# Patient Record
Sex: FEMALE | Race: WHITE | NOT HISPANIC OR LATINO | Employment: OTHER | URBAN - METROPOLITAN AREA
[De-identification: names, ages, dates, MRNs, and addresses within clinical notes are randomized per-mention and may not be internally consistent; named-entity substitution may affect disease eponyms.]

---

## 2022-03-25 DIAGNOSIS — R09.02 HYPOXIA: Primary | ICD-10-CM

## 2022-03-25 DIAGNOSIS — R51.9 NONINTRACTABLE HEADACHE, UNSPECIFIED CHRONICITY PATTERN, UNSPECIFIED HEADACHE TYPE: Primary | ICD-10-CM

## 2022-03-25 LAB
EKG IMPRESSION: NORMAL
EKG IMPRESSION: NORMAL

## 2022-12-19 DIAGNOSIS — Z01.810 PRE-OPERATIVE CARDIOVASCULAR EXAMINATION: Primary | ICD-10-CM

## 2022-12-21 LAB — EKG IMPRESSION: NORMAL

## 2023-05-08 ENCOUNTER — APPOINTMENT (OUTPATIENT)
Dept: RADIOLOGY | Facility: MEDICAL CENTER | Age: 63
End: 2023-05-08
Attending: EMERGENCY MEDICINE
Payer: MEDICARE

## 2023-05-08 ENCOUNTER — HOSPITAL ENCOUNTER (OUTPATIENT)
Facility: MEDICAL CENTER | Age: 63
End: 2023-05-11
Attending: EMERGENCY MEDICINE | Admitting: STUDENT IN AN ORGANIZED HEALTH CARE EDUCATION/TRAINING PROGRAM
Payer: MEDICARE

## 2023-05-08 DIAGNOSIS — I48.91 NEW ONSET A-FIB (HCC): ICD-10-CM

## 2023-05-08 DIAGNOSIS — R06.09 EXERTIONAL DYSPNEA: ICD-10-CM

## 2023-05-08 DIAGNOSIS — G47.34 NOCTURNAL HYPOXIA: ICD-10-CM

## 2023-05-08 DIAGNOSIS — R07.2 PRECORDIAL CHEST PAIN: ICD-10-CM

## 2023-05-08 DIAGNOSIS — I48.91 ATRIAL FIBRILLATION WITH RVR (HCC): ICD-10-CM

## 2023-05-08 PROBLEM — R07.9 CHEST PAIN: Status: ACTIVE | Noted: 2023-05-08

## 2023-05-08 PROBLEM — I10 HYPERTENSION: Status: ACTIVE | Noted: 2023-05-08

## 2023-05-08 PROBLEM — E66.01 CLASS 3 SEVERE OBESITY IN ADULT (HCC): Status: ACTIVE | Noted: 2023-05-08

## 2023-05-08 PROBLEM — E11.9 DIABETES (HCC): Status: ACTIVE | Noted: 2023-05-08

## 2023-05-08 PROBLEM — I48.0 PAROXYSMAL ATRIAL FIBRILLATION (HCC): Status: ACTIVE | Noted: 2023-05-08

## 2023-05-08 PROBLEM — E78.5 HYPERLIPIDEMIA: Status: ACTIVE | Noted: 2023-05-08

## 2023-05-08 LAB
ALBUMIN SERPL BCP-MCNC: 4.5 G/DL (ref 3.2–4.9)
ALBUMIN/GLOB SERPL: 1.7 G/DL
ALP SERPL-CCNC: 77 U/L (ref 30–99)
ALT SERPL-CCNC: 14 U/L (ref 2–50)
ANION GAP SERPL CALC-SCNC: 12 MMOL/L (ref 7–16)
APTT PPP: 30.5 SEC (ref 24.7–36)
AST SERPL-CCNC: 18 U/L (ref 12–45)
BASOPHILS # BLD AUTO: 0.8 % (ref 0–1.8)
BASOPHILS # BLD: 0.07 K/UL (ref 0–0.12)
BILIRUB SERPL-MCNC: 0.4 MG/DL (ref 0.1–1.5)
BUN SERPL-MCNC: 13 MG/DL (ref 8–22)
CALCIUM ALBUM COR SERPL-MCNC: 9.3 MG/DL (ref 8.5–10.5)
CALCIUM SERPL-MCNC: 9.7 MG/DL (ref 8.5–10.5)
CHLORIDE SERPL-SCNC: 98 MMOL/L (ref 96–112)
CO2 SERPL-SCNC: 25 MMOL/L (ref 20–33)
CREAT SERPL-MCNC: 0.71 MG/DL (ref 0.5–1.4)
EKG IMPRESSION: NORMAL
EOSINOPHIL # BLD AUTO: 0.07 K/UL (ref 0–0.51)
EOSINOPHIL NFR BLD: 0.8 % (ref 0–6.9)
ERYTHROCYTE [DISTWIDTH] IN BLOOD BY AUTOMATED COUNT: 48.5 FL (ref 35.9–50)
EST. AVERAGE GLUCOSE BLD GHB EST-MCNC: 126 MG/DL
GFR SERPLBLD CREATININE-BSD FMLA CKD-EPI: 94 ML/MIN/1.73 M 2
GLOBULIN SER CALC-MCNC: 2.7 G/DL (ref 1.9–3.5)
GLUCOSE SERPL-MCNC: 113 MG/DL (ref 65–99)
HBA1C MFR BLD: 6 % (ref 4–5.6)
HCT VFR BLD AUTO: 44.9 % (ref 37–47)
HGB BLD-MCNC: 14.9 G/DL (ref 12–16)
IMM GRANULOCYTES # BLD AUTO: 0.03 K/UL (ref 0–0.11)
IMM GRANULOCYTES NFR BLD AUTO: 0.3 % (ref 0–0.9)
INR PPP: 1.06 (ref 0.87–1.13)
LYMPHOCYTES # BLD AUTO: 2.26 K/UL (ref 1–4.8)
LYMPHOCYTES NFR BLD: 24.8 % (ref 22–41)
MAGNESIUM SERPL-MCNC: 1.7 MG/DL (ref 1.5–2.5)
MCH RBC QN AUTO: 31.7 PG (ref 27–33)
MCHC RBC AUTO-ENTMCNC: 33.2 G/DL (ref 33.6–35)
MCV RBC AUTO: 95.5 FL (ref 81.4–97.8)
MONOCYTES # BLD AUTO: 0.64 K/UL (ref 0–0.85)
MONOCYTES NFR BLD AUTO: 7 % (ref 0–13.4)
NEUTROPHILS # BLD AUTO: 6.05 K/UL (ref 2–7.15)
NEUTROPHILS NFR BLD: 66.3 % (ref 44–72)
NRBC # BLD AUTO: 0 K/UL
NRBC BLD-RTO: 0 /100 WBC
NT-PROBNP SERPL IA-MCNC: 1284 PG/ML (ref 0–125)
PLATELET # BLD AUTO: 352 K/UL (ref 164–446)
PMV BLD AUTO: 10.3 FL (ref 9–12.9)
POTASSIUM SERPL-SCNC: 4.1 MMOL/L (ref 3.6–5.5)
PROT SERPL-MCNC: 7.2 G/DL (ref 6–8.2)
PROTHROMBIN TIME: 13.7 SEC (ref 12–14.6)
RBC # BLD AUTO: 4.7 M/UL (ref 4.2–5.4)
SODIUM SERPL-SCNC: 135 MMOL/L (ref 135–145)
TROPONIN T SERPL-MCNC: 8 NG/L (ref 6–19)
WBC # BLD AUTO: 9.1 K/UL (ref 4.8–10.8)

## 2023-05-08 PROCEDURE — 71045 X-RAY EXAM CHEST 1 VIEW: CPT

## 2023-05-08 PROCEDURE — 36415 COLL VENOUS BLD VENIPUNCTURE: CPT

## 2023-05-08 PROCEDURE — 83036 HEMOGLOBIN GLYCOSYLATED A1C: CPT

## 2023-05-08 PROCEDURE — 94760 N-INVAS EAR/PLS OXIMETRY 1: CPT

## 2023-05-08 PROCEDURE — 83880 ASSAY OF NATRIURETIC PEPTIDE: CPT

## 2023-05-08 PROCEDURE — A9270 NON-COVERED ITEM OR SERVICE: HCPCS | Mod: UD | Performed by: EMERGENCY MEDICINE

## 2023-05-08 PROCEDURE — 93005 ELECTROCARDIOGRAM TRACING: CPT

## 2023-05-08 PROCEDURE — G0378 HOSPITAL OBSERVATION PER HR: HCPCS

## 2023-05-08 PROCEDURE — A9270 NON-COVERED ITEM OR SERVICE: HCPCS | Mod: UD | Performed by: STUDENT IN AN ORGANIZED HEALTH CARE EDUCATION/TRAINING PROGRAM

## 2023-05-08 PROCEDURE — 700102 HCHG RX REV CODE 250 W/ 637 OVERRIDE(OP): Mod: UD | Performed by: STUDENT IN AN ORGANIZED HEALTH CARE EDUCATION/TRAINING PROGRAM

## 2023-05-08 PROCEDURE — 85730 THROMBOPLASTIN TIME PARTIAL: CPT

## 2023-05-08 PROCEDURE — 85025 COMPLETE CBC W/AUTO DIFF WBC: CPT

## 2023-05-08 PROCEDURE — 99223 1ST HOSP IP/OBS HIGH 75: CPT | Mod: AI | Performed by: STUDENT IN AN ORGANIZED HEALTH CARE EDUCATION/TRAINING PROGRAM

## 2023-05-08 PROCEDURE — 83735 ASSAY OF MAGNESIUM: CPT

## 2023-05-08 PROCEDURE — 93005 ELECTROCARDIOGRAM TRACING: CPT | Performed by: EMERGENCY MEDICINE

## 2023-05-08 PROCEDURE — 84443 ASSAY THYROID STIM HORMONE: CPT

## 2023-05-08 PROCEDURE — 700102 HCHG RX REV CODE 250 W/ 637 OVERRIDE(OP): Mod: UD | Performed by: EMERGENCY MEDICINE

## 2023-05-08 PROCEDURE — 85610 PROTHROMBIN TIME: CPT

## 2023-05-08 PROCEDURE — 84484 ASSAY OF TROPONIN QUANT: CPT

## 2023-05-08 PROCEDURE — 96375 TX/PRO/DX INJ NEW DRUG ADDON: CPT

## 2023-05-08 PROCEDURE — 96374 THER/PROPH/DIAG INJ IV PUSH: CPT

## 2023-05-08 PROCEDURE — 99285 EMERGENCY DEPT VISIT HI MDM: CPT

## 2023-05-08 PROCEDURE — 80053 COMPREHEN METABOLIC PANEL: CPT

## 2023-05-08 RX ORDER — DILTIAZEM HYDROCHLORIDE 5 MG/ML
10 INJECTION INTRAVENOUS EVERY 4 HOURS PRN
Status: DISCONTINUED | OUTPATIENT
Start: 2023-05-08 | End: 2023-05-11 | Stop reason: HOSPADM

## 2023-05-08 RX ORDER — ASPIRIN 81 MG/1
324 TABLET, CHEWABLE ORAL DAILY
Status: DISCONTINUED | OUTPATIENT
Start: 2023-05-09 | End: 2023-05-08

## 2023-05-08 RX ORDER — MONTELUKAST SODIUM 10 MG/1
10 TABLET ORAL
COMMUNITY

## 2023-05-08 RX ORDER — PROCHLORPERAZINE EDISYLATE 5 MG/ML
5-10 INJECTION INTRAMUSCULAR; INTRAVENOUS EVERY 4 HOURS PRN
Status: DISCONTINUED | OUTPATIENT
Start: 2023-05-08 | End: 2023-05-11 | Stop reason: HOSPADM

## 2023-05-08 RX ORDER — ACETAMINOPHEN 325 MG/1
650 TABLET ORAL EVERY 6 HOURS PRN
Status: DISCONTINUED | OUTPATIENT
Start: 2023-05-08 | End: 2023-05-11 | Stop reason: HOSPADM

## 2023-05-08 RX ORDER — ONDANSETRON 4 MG/1
4 TABLET, ORALLY DISINTEGRATING ORAL EVERY 4 HOURS PRN
Status: DISCONTINUED | OUTPATIENT
Start: 2023-05-08 | End: 2023-05-11 | Stop reason: HOSPADM

## 2023-05-08 RX ORDER — ONDANSETRON 2 MG/ML
4 INJECTION INTRAMUSCULAR; INTRAVENOUS EVERY 4 HOURS PRN
Status: DISCONTINUED | OUTPATIENT
Start: 2023-05-08 | End: 2023-05-11 | Stop reason: HOSPADM

## 2023-05-08 RX ORDER — DOCUSATE SODIUM 100 MG/1
100 CAPSULE, LIQUID FILLED ORAL 2 TIMES DAILY
COMMUNITY

## 2023-05-08 RX ORDER — AMOXICILLIN 250 MG
2 CAPSULE ORAL 2 TIMES DAILY
Status: DISCONTINUED | OUTPATIENT
Start: 2023-05-08 | End: 2023-05-11 | Stop reason: HOSPADM

## 2023-05-08 RX ORDER — BISACODYL 10 MG
10 SUPPOSITORY, RECTAL RECTAL
Status: DISCONTINUED | OUTPATIENT
Start: 2023-05-08 | End: 2023-05-11 | Stop reason: HOSPADM

## 2023-05-08 RX ORDER — OMEPRAZOLE 20 MG/1
20 CAPSULE, DELAYED RELEASE ORAL DAILY
COMMUNITY

## 2023-05-08 RX ORDER — LISINOPRIL AND HYDROCHLOROTHIAZIDE 25; 20 MG/1; MG/1
2 TABLET ORAL EVERY MORNING
COMMUNITY

## 2023-05-08 RX ORDER — ATORVASTATIN CALCIUM 20 MG/1
20 TABLET, FILM COATED ORAL
Status: DISCONTINUED | OUTPATIENT
Start: 2023-05-09 | End: 2023-05-11 | Stop reason: HOSPADM

## 2023-05-08 RX ORDER — TRAZODONE HYDROCHLORIDE 50 MG/1
50 TABLET ORAL
COMMUNITY

## 2023-05-08 RX ORDER — ATORVASTATIN CALCIUM 20 MG/1
20 TABLET, FILM COATED ORAL
COMMUNITY

## 2023-05-08 RX ORDER — ASPIRIN 325 MG
325 TABLET ORAL DAILY
Status: DISCONTINUED | OUTPATIENT
Start: 2023-05-09 | End: 2023-05-08

## 2023-05-08 RX ORDER — LABETALOL HYDROCHLORIDE 5 MG/ML
10 INJECTION, SOLUTION INTRAVENOUS EVERY 4 HOURS PRN
Status: DISCONTINUED | OUTPATIENT
Start: 2023-05-08 | End: 2023-05-11 | Stop reason: HOSPADM

## 2023-05-08 RX ORDER — POLYETHYLENE GLYCOL 3350 17 G/17G
1 POWDER, FOR SOLUTION ORAL
Status: DISCONTINUED | OUTPATIENT
Start: 2023-05-08 | End: 2023-05-11 | Stop reason: HOSPADM

## 2023-05-08 RX ORDER — OMEPRAZOLE 20 MG/1
20 CAPSULE, DELAYED RELEASE ORAL DAILY
Status: DISCONTINUED | OUTPATIENT
Start: 2023-05-09 | End: 2023-05-11 | Stop reason: HOSPADM

## 2023-05-08 RX ORDER — FUROSEMIDE 40 MG/1
40 TABLET ORAL DAILY
Status: DISCONTINUED | OUTPATIENT
Start: 2023-05-09 | End: 2023-05-11 | Stop reason: HOSPADM

## 2023-05-08 RX ORDER — PROMETHAZINE HYDROCHLORIDE 25 MG/1
12.5-25 TABLET ORAL EVERY 4 HOURS PRN
Status: DISCONTINUED | OUTPATIENT
Start: 2023-05-08 | End: 2023-05-11 | Stop reason: HOSPADM

## 2023-05-08 RX ORDER — MONTELUKAST SODIUM 10 MG/1
10 TABLET ORAL
Status: DISCONTINUED | OUTPATIENT
Start: 2023-05-09 | End: 2023-05-11 | Stop reason: HOSPADM

## 2023-05-08 RX ORDER — LISINOPRIL AND HYDROCHLOROTHIAZIDE 25; 20 MG/1; MG/1
2 TABLET ORAL EVERY MORNING
Status: DISCONTINUED | OUTPATIENT
Start: 2023-05-09 | End: 2023-05-09

## 2023-05-08 RX ORDER — FUROSEMIDE 40 MG/1
40 TABLET ORAL DAILY
COMMUNITY

## 2023-05-08 RX ORDER — ASPIRIN 300 MG/1
300 SUPPOSITORY RECTAL DAILY
Status: DISCONTINUED | OUTPATIENT
Start: 2023-05-09 | End: 2023-05-08

## 2023-05-08 RX ORDER — DILTIAZEM HYDROCHLORIDE 60 MG/1
60 TABLET, FILM COATED ORAL ONCE
Status: COMPLETED | OUTPATIENT
Start: 2023-05-08 | End: 2023-05-08

## 2023-05-08 RX ORDER — TRAZODONE HYDROCHLORIDE 50 MG/1
50 TABLET ORAL
Status: DISCONTINUED | OUTPATIENT
Start: 2023-05-09 | End: 2023-05-11 | Stop reason: HOSPADM

## 2023-05-08 RX ORDER — PROMETHAZINE HYDROCHLORIDE 25 MG/1
12.5-25 SUPPOSITORY RECTAL EVERY 4 HOURS PRN
Status: DISCONTINUED | OUTPATIENT
Start: 2023-05-08 | End: 2023-05-11 | Stop reason: HOSPADM

## 2023-05-08 RX ORDER — DILTIAZEM HYDROCHLORIDE 5 MG/ML
25 INJECTION INTRAVENOUS
Status: DISCONTINUED | OUTPATIENT
Start: 2023-05-08 | End: 2023-05-08

## 2023-05-08 RX ADMIN — RIVAROXABAN 20 MG: 20 TABLET, FILM COATED ORAL at 18:18

## 2023-05-08 RX ADMIN — DILTIAZEM HYDROCHLORIDE 60 MG: 60 TABLET, FILM COATED ORAL at 18:18

## 2023-05-08 RX ADMIN — DILTIAZEM HYDROCHLORIDE 30 MG: 30 TABLET, FILM COATED ORAL at 23:17

## 2023-05-08 ASSESSMENT — PATIENT HEALTH QUESTIONNAIRE - PHQ9
2. FEELING DOWN, DEPRESSED, IRRITABLE, OR HOPELESS: NOT AT ALL
1. LITTLE INTEREST OR PLEASURE IN DOING THINGS: NOT AT ALL
SUM OF ALL RESPONSES TO PHQ9 QUESTIONS 1 AND 2: 0

## 2023-05-08 ASSESSMENT — CHA2DS2 SCORE
PRIOR STROKE OR TIA OR THROMBOEMBOLISM: NO
DIABETES: NO
CHF OR LEFT VENTRICULAR DYSFUNCTION: NO
VASCULAR DISEASE: NO
AGE 65 TO 74: NO
CHA2DS2 VASC SCORE: 2
AGE 75 OR GREATER: NO
SEX: FEMALE
HYPERTENSION: YES

## 2023-05-08 ASSESSMENT — PAIN DESCRIPTION - PAIN TYPE: TYPE: ACUTE PAIN

## 2023-05-08 ASSESSMENT — ENCOUNTER SYMPTOMS
ABDOMINAL PAIN: 0
BRUISES/BLEEDS EASILY: 0
CHILLS: 0
FEVER: 0
HEADACHES: 0
MYALGIAS: 0
NAUSEA: 0
DIZZINESS: 0
HEARTBURN: 0
SHORTNESS OF BREATH: 1
VOMITING: 0
PALPITATIONS: 1
BLURRED VISION: 0
DOUBLE VISION: 0
DEPRESSION: 0

## 2023-05-08 ASSESSMENT — LIFESTYLE VARIABLES
EVER HAD A DRINK FIRST THING IN THE MORNING TO STEADY YOUR NERVES TO GET RID OF A HANGOVER: NO
HAVE PEOPLE ANNOYED YOU BY CRITICIZING YOUR DRINKING: NO
HAVE YOU EVER FELT YOU SHOULD CUT DOWN ON YOUR DRINKING: NO
TOTAL SCORE: 0
HOW MANY TIMES IN THE PAST YEAR HAVE YOU HAD 5 OR MORE DRINKS IN A DAY: 0
AVERAGE NUMBER OF DAYS PER WEEK YOU HAVE A DRINK CONTAINING ALCOHOL: 0
EVER FELT BAD OR GUILTY ABOUT YOUR DRINKING: NO
ALCOHOL_USE: NO
ON A TYPICAL DAY WHEN YOU DRINK ALCOHOL HOW MANY DRINKS DO YOU HAVE: 0
SUBSTANCE_ABUSE: 0
TOTAL SCORE: 0
CONSUMPTION TOTAL: NEGATIVE
TOTAL SCORE: 0

## 2023-05-08 ASSESSMENT — FIBROSIS 4 INDEX: FIB4 SCORE: 0.87

## 2023-05-08 NOTE — ED TRIAGE NOTES
Chief Complaint   Patient presents with    Hypertension    Sent by MD Urrutia at [facility] appointment and found to have hypertension/abnormal ekg and sent to [facility] for further eval. Pt reports taking high blood pressure medication.    Shortness of Breath     EKG shows a-fib.     Explained to pt triage process, made pt aware to tell this RN/staff of any changes/concerns, pt verbalized understanding of process and instructions given. Pt to FRANCISCO ghosh.

## 2023-05-09 ENCOUNTER — APPOINTMENT (OUTPATIENT)
Dept: RADIOLOGY | Facility: MEDICAL CENTER | Age: 63
End: 2023-05-09
Attending: STUDENT IN AN ORGANIZED HEALTH CARE EDUCATION/TRAINING PROGRAM
Payer: MEDICARE

## 2023-05-09 ENCOUNTER — APPOINTMENT (OUTPATIENT)
Dept: CARDIOLOGY | Facility: MEDICAL CENTER | Age: 63
End: 2023-05-09
Attending: STUDENT IN AN ORGANIZED HEALTH CARE EDUCATION/TRAINING PROGRAM
Payer: MEDICARE

## 2023-05-09 PROBLEM — J45.909 ASTHMA: Status: ACTIVE | Noted: 2023-05-09

## 2023-05-09 LAB
ALBUMIN SERPL BCP-MCNC: 3.8 G/DL (ref 3.2–4.9)
AMPHET UR QL SCN: NEGATIVE
BARBITURATES UR QL SCN: NEGATIVE
BASOPHILS # BLD AUTO: 0.4 % (ref 0–1.8)
BASOPHILS # BLD: 0.03 K/UL (ref 0–0.12)
BENZODIAZ UR QL SCN: NEGATIVE
BUN SERPL-MCNC: 13 MG/DL (ref 8–22)
BZE UR QL SCN: NEGATIVE
CALCIUM ALBUM COR SERPL-MCNC: 9.4 MG/DL (ref 8.5–10.5)
CALCIUM SERPL-MCNC: 9.2 MG/DL (ref 8.5–10.5)
CANNABINOIDS UR QL SCN: NEGATIVE
CHLORIDE SERPL-SCNC: 99 MMOL/L (ref 96–112)
CHOLEST SERPL-MCNC: 139 MG/DL (ref 100–199)
CO2 SERPL-SCNC: 27 MMOL/L (ref 20–33)
CREAT SERPL-MCNC: 0.57 MG/DL (ref 0.5–1.4)
EKG IMPRESSION: NORMAL
EOSINOPHIL # BLD AUTO: 0.09 K/UL (ref 0–0.51)
EOSINOPHIL NFR BLD: 1.1 % (ref 0–6.9)
ERYTHROCYTE [DISTWIDTH] IN BLOOD BY AUTOMATED COUNT: 47 FL (ref 35.9–50)
FENTANYL UR QL: NEGATIVE
GFR SERPLBLD CREATININE-BSD FMLA CKD-EPI: 101 ML/MIN/1.73 M 2
GLUCOSE BLD STRIP.AUTO-MCNC: 117 MG/DL (ref 65–99)
GLUCOSE BLD STRIP.AUTO-MCNC: 120 MG/DL (ref 65–99)
GLUCOSE BLD STRIP.AUTO-MCNC: 129 MG/DL (ref 65–99)
GLUCOSE BLD STRIP.AUTO-MCNC: 177 MG/DL (ref 65–99)
GLUCOSE SERPL-MCNC: 124 MG/DL (ref 65–99)
HCT VFR BLD AUTO: 38.9 % (ref 37–47)
HDLC SERPL-MCNC: 51 MG/DL
HGB BLD-MCNC: 13.1 G/DL (ref 12–16)
IMM GRANULOCYTES # BLD AUTO: 0.03 K/UL (ref 0–0.11)
IMM GRANULOCYTES NFR BLD AUTO: 0.4 % (ref 0–0.9)
LDLC SERPL CALC-MCNC: 76 MG/DL
LV EJECT FRACT  99904: 65
LV EJECT FRACT MOD 2C 99903: 66.63
LV EJECT FRACT MOD 4C 99902: 64.94
LV EJECT FRACT MOD BP 99901: 65.9
LYMPHOCYTES # BLD AUTO: 2.51 K/UL (ref 1–4.8)
LYMPHOCYTES NFR BLD: 29.7 % (ref 22–41)
MAGNESIUM SERPL-MCNC: 1.8 MG/DL (ref 1.5–2.5)
MCH RBC QN AUTO: 31.4 PG (ref 27–33)
MCHC RBC AUTO-ENTMCNC: 33.7 G/DL (ref 33.6–35)
MCV RBC AUTO: 93.3 FL (ref 81.4–97.8)
METHADONE UR QL SCN: NEGATIVE
MONOCYTES # BLD AUTO: 0.68 K/UL (ref 0–0.85)
MONOCYTES NFR BLD AUTO: 8 % (ref 0–13.4)
NEUTROPHILS # BLD AUTO: 5.12 K/UL (ref 2–7.15)
NEUTROPHILS NFR BLD: 60.4 % (ref 44–72)
NRBC # BLD AUTO: 0 K/UL
NRBC BLD-RTO: 0 /100 WBC
OPIATES UR QL SCN: NEGATIVE
OXYCODONE UR QL SCN: NEGATIVE
PCP UR QL SCN: NEGATIVE
PHOSPHATE SERPL-MCNC: 3.3 MG/DL (ref 2.5–4.5)
PLATELET # BLD AUTO: 300 K/UL (ref 164–446)
PMV BLD AUTO: 10 FL (ref 9–12.9)
POTASSIUM SERPL-SCNC: 3.8 MMOL/L (ref 3.6–5.5)
PROPOXYPH UR QL SCN: NEGATIVE
RBC # BLD AUTO: 4.17 M/UL (ref 4.2–5.4)
SODIUM SERPL-SCNC: 137 MMOL/L (ref 135–145)
TRIGL SERPL-MCNC: 59 MG/DL (ref 0–149)
TROPONIN T SERPL-MCNC: 11 NG/L (ref 6–19)
TSH SERPL DL<=0.005 MIU/L-ACNC: 1.32 UIU/ML (ref 0.38–5.33)
WBC # BLD AUTO: 8.5 K/UL (ref 4.8–10.8)

## 2023-05-09 PROCEDURE — 85025 COMPLETE CBC W/AUTO DIFF WBC: CPT

## 2023-05-09 PROCEDURE — 93306 TTE W/DOPPLER COMPLETE: CPT

## 2023-05-09 PROCEDURE — 78452 HT MUSCLE IMAGE SPECT MULT: CPT

## 2023-05-09 PROCEDURE — 700111 HCHG RX REV CODE 636 W/ 250 OVERRIDE (IP): Mod: UD

## 2023-05-09 PROCEDURE — 93005 ELECTROCARDIOGRAM TRACING: CPT | Performed by: STUDENT IN AN ORGANIZED HEALTH CARE EDUCATION/TRAINING PROGRAM

## 2023-05-09 PROCEDURE — 99204 OFFICE O/P NEW MOD 45 MIN: CPT | Mod: GC | Performed by: INTERNAL MEDICINE

## 2023-05-09 PROCEDURE — 84484 ASSAY OF TROPONIN QUANT: CPT

## 2023-05-09 PROCEDURE — 36415 COLL VENOUS BLD VENIPUNCTURE: CPT

## 2023-05-09 PROCEDURE — 700102 HCHG RX REV CODE 250 W/ 637 OVERRIDE(OP): Mod: UD | Performed by: STUDENT IN AN ORGANIZED HEALTH CARE EDUCATION/TRAINING PROGRAM

## 2023-05-09 PROCEDURE — 82962 GLUCOSE BLOOD TEST: CPT

## 2023-05-09 PROCEDURE — 93306 TTE W/DOPPLER COMPLETE: CPT | Mod: 26 | Performed by: INTERNAL MEDICINE

## 2023-05-09 PROCEDURE — 83735 ASSAY OF MAGNESIUM: CPT

## 2023-05-09 PROCEDURE — 99233 SBSQ HOSP IP/OBS HIGH 50: CPT | Performed by: INTERNAL MEDICINE

## 2023-05-09 PROCEDURE — 80061 LIPID PANEL: CPT

## 2023-05-09 PROCEDURE — G0378 HOSPITAL OBSERVATION PER HR: HCPCS

## 2023-05-09 PROCEDURE — A9270 NON-COVERED ITEM OR SERVICE: HCPCS | Mod: UD | Performed by: STUDENT IN AN ORGANIZED HEALTH CARE EDUCATION/TRAINING PROGRAM

## 2023-05-09 PROCEDURE — 80307 DRUG TEST PRSMV CHEM ANLYZR: CPT

## 2023-05-09 PROCEDURE — 80069 RENAL FUNCTION PANEL: CPT

## 2023-05-09 PROCEDURE — 93010 ELECTROCARDIOGRAM REPORT: CPT | Performed by: INTERNAL MEDICINE

## 2023-05-09 RX ORDER — LISINOPRIL 20 MG/1
20 TABLET ORAL
Status: DISCONTINUED | OUTPATIENT
Start: 2023-05-09 | End: 2023-05-11 | Stop reason: HOSPADM

## 2023-05-09 RX ORDER — REGADENOSON 0.08 MG/ML
INJECTION, SOLUTION INTRAVENOUS
Status: COMPLETED
Start: 2023-05-09 | End: 2023-05-09

## 2023-05-09 RX ORDER — HYDROCHLOROTHIAZIDE 25 MG/1
25 TABLET ORAL
Status: DISCONTINUED | OUTPATIENT
Start: 2023-05-09 | End: 2023-05-11 | Stop reason: HOSPADM

## 2023-05-09 RX ORDER — AMINOPHYLLINE 25 MG/ML
100 INJECTION, SOLUTION INTRAVENOUS
Status: DISCONTINUED | OUTPATIENT
Start: 2023-05-09 | End: 2023-05-11 | Stop reason: HOSPADM

## 2023-05-09 RX ORDER — REGADENOSON 0.08 MG/ML
0.4 INJECTION, SOLUTION INTRAVENOUS ONCE
Status: ACTIVE | OUTPATIENT
Start: 2023-05-09 | End: 2023-05-10

## 2023-05-09 RX ORDER — DILTIAZEM HCL 90 MG
90 TABLET ORAL EVERY 6 HOURS
Status: DISCONTINUED | OUTPATIENT
Start: 2023-05-09 | End: 2023-05-10

## 2023-05-09 RX ADMIN — DILTIAZEM HYDROCHLORIDE 90 MG: 90 TABLET, FILM COATED ORAL at 11:24

## 2023-05-09 RX ADMIN — MONTELUKAST 10 MG: 10 TABLET, FILM COATED ORAL at 22:19

## 2023-05-09 RX ADMIN — ATORVASTATIN CALCIUM 20 MG: 20 TABLET, FILM COATED ORAL at 21:01

## 2023-05-09 RX ADMIN — APIXABAN 5 MG: 5 TABLET, FILM COATED ORAL at 18:32

## 2023-05-09 RX ADMIN — TRAZODONE HYDROCHLORIDE 50 MG: 50 TABLET ORAL at 21:01

## 2023-05-09 RX ADMIN — OMEPRAZOLE 20 MG: 20 CAPSULE, DELAYED RELEASE ORAL at 05:14

## 2023-05-09 RX ADMIN — HYDROCHLOROTHIAZIDE 25 MG: 25 TABLET ORAL at 05:14

## 2023-05-09 RX ADMIN — REGADENOSON 0.4 MG: 0.08 INJECTION, SOLUTION INTRAVENOUS at 15:05

## 2023-05-09 RX ADMIN — DILTIAZEM HYDROCHLORIDE 90 MG: 90 TABLET, FILM COATED ORAL at 18:31

## 2023-05-09 RX ADMIN — LISINOPRIL 20 MG: 20 TABLET ORAL at 05:14

## 2023-05-09 RX ADMIN — FUROSEMIDE 40 MG: 40 TABLET ORAL at 05:14

## 2023-05-09 ASSESSMENT — PATIENT HEALTH QUESTIONNAIRE - PHQ9
2. FEELING DOWN, DEPRESSED, IRRITABLE, OR HOPELESS: NOT AT ALL
1. LITTLE INTEREST OR PLEASURE IN DOING THINGS: NOT AT ALL
2. FEELING DOWN, DEPRESSED, IRRITABLE, OR HOPELESS: NOT AT ALL
SUM OF ALL RESPONSES TO PHQ9 QUESTIONS 1 AND 2: 0
1. LITTLE INTEREST OR PLEASURE IN DOING THINGS: NOT AT ALL
SUM OF ALL RESPONSES TO PHQ9 QUESTIONS 1 AND 2: 0

## 2023-05-09 ASSESSMENT — ENCOUNTER SYMPTOMS
GASTROINTESTINAL NEGATIVE: 1
MYALGIAS: 1
RESPIRATORY NEGATIVE: 1
PALPITATIONS: 1
EYES NEGATIVE: 1
NEUROLOGICAL NEGATIVE: 1
FEVER: 0
PSYCHIATRIC NEGATIVE: 1
CHILLS: 0

## 2023-05-09 ASSESSMENT — PAIN DESCRIPTION - PAIN TYPE
TYPE: ACUTE PAIN
TYPE: ACUTE PAIN

## 2023-05-09 NOTE — DISCHARGE PLANNING
Medical Social Work    MSW received a call from [employer] that pt's spouse at bedside said he was going to sleep in his car while pt is being admitted.  Per chart pt and spouse are from [place], [place].  MSW met with pt at bedside who states that her spouse just left.  MSW provided lodging letter and list to pt.  Pt states that her  is stubborn and will likely sleep in his car and not return until morning.  MSW still left lodging letter and list with pt should they need it.  Bedside RN updated.

## 2023-05-09 NOTE — HOSPITAL COURSE
Ms. Jeanna Santana is a 62 y.o. female with history of CAD, hypertension, hyperlipidemia and diabetes who presented 5/8/2023 with evaluation for arrhythmia.      Patient was seen by ophthalmology office for elective cataract surgery.  While at the office, noted to have tachycardia, EKG was done, due to concerning arrhythmia, patient was subsequently referred to ER for evaluation.      In ER, patient was found to be in a state of atrial fibrillation RVR with rate as high as 140s.  It appears that she was given Cardizem oral 60 mg time, and heart rate now in 80-100s.  Patient also complained of chest pain, palpitation, shortness of breath at this time.  Medicine service was asked to admit patient for ACS rule out.  Admitted to medicine service for further evaluation and treatment.    Patient was monitored overnight with no events noted.  Echocardiogram pursued noting LVEF approximately 60-65% with diastolic function difficult to assess due to arrhythmia, right ventricle normal size and systolic function and estimated right ventricular systolic pressure at 28 mmHg.  Moderate biatrial dilation with no significant valvular abnormalities.  Stress test also ordered and showed prior MI or areas of ischemia.  Cardiology was consulted due to new onset atrial fibrillation with RVR, GARRY cardioversion was recommended.

## 2023-05-09 NOTE — ASSESSMENT & PLAN NOTE
-New onset of A-fib, unclear how long , she was asymptomatic , found incidentally at her ophthalmology office  -She continues to be in atrial fibrillation however now is rate controlled, on oral diltiazem  Started on flecainide by cardiology, plan for cardioversion today  continue anticoagulation with Eliquis that was started on this admission, CHADVASC score: 3  Further plan of care post cardioversion

## 2023-05-09 NOTE — ED NOTES
Patient ambulated to HUGO 20 without incident. HUGO process explained to patient. Patient oriented to care area. Chart up for ERP.

## 2023-05-09 NOTE — PROGRESS NOTES
Assumed care at 0700. Denies CP or dyspnea. Cardiac monitor shows Afib/Afib with RVR, rate . Pending stress test and ECHO today.

## 2023-05-09 NOTE — CARE PLAN
The patient is Watcher - Medium risk of patient condition declining or worsening    Shift Goals  Clinical Goals: Monitor cardiac activity, stress test, ECHO  Patient Goals: Rest    Progress made toward(s) clinical / shift goals:  Stress test today and cardioversion tomorrow.     Patient is not progressing towards the following goals:

## 2023-05-09 NOTE — ASSESSMENT & PLAN NOTE
History of type 2 diabetes most recent hemoglobin A1c is 6.0%  Takes metformin monotherapy at home  Blood sugars have been less than 180 we will discontinue insulin sliding scale as she has not required any   Normal rate, regular rhythm.  Heart sounds S1, S2.  No murmurs, rubs or gallops. DISPLAY PLAN FREE TEXT

## 2023-05-09 NOTE — PROGRESS NOTES
Returned from [facility]. Eating in room. States feels ok. HR, in and out of Afib/Afib with RVR. Rate as high as 140.

## 2023-05-09 NOTE — ASSESSMENT & PLAN NOTE
Chronic hypertension currently controlled continue home lisinopril hydrochlorothiazide and furosemide  Has since been started on diltiazem and flecainide  Monitor BP

## 2023-05-09 NOTE — ED NOTES
Pt medicated per MAR for HTN    Has not taken BP meds today as she was told not to pending cataract surgery. Sent by eye doctor. Pt is from [place] w/ no known hx of A-Fib, HTN hx

## 2023-05-09 NOTE — ASSESSMENT & PLAN NOTE
-Likely secondary to A-fib  -Rule out ACS  -Trend CE, EKG  -As needed nitro SL, morphine  -Check echo, MPS

## 2023-05-09 NOTE — PROGRESS NOTES
[facility] Medicine Daily Progress Note    Date of Service  5/9/2023    Chief Complaint  Jeanna Santana is a 62 y.o. female admitted 5/8/2023 with arrhythmia noted at the ophthalmology office    Hospital Course  MsAdam Santana is a 62 y.o. female with history of CAD, hypertension, hyperlipidemia, diabetes who presented 5/8/2023 with evaluation for arrhythmia.      Patient was seen by ophthalmology office for elective cataract surgery.  While at the office, noted to have tachycardia, EKG was done, due to concerning arrhythmia, patient was subsequently referred to ER for evaluation.      In ER, patient was found to be in a state of atrial fibrillation RVR with rate as high as 140s.  It appears that she was given Cardizem oral 60 mg time, and heart rate now in 80-100s.  Patient also complained of chest pain, palpitation, shortness of breath at this time.  Medicine service was asked to admit patient for ACS rule out.  Admitted to medicine service for further evaluation and treatment.    Patient was monitored overnight with no events noted.  Echocardiogram pursued noting LVEF approximately 60-65% with diastolic function difficult to assess due to arrhythmia, right ventricle normal size and systolic function and estimated right ventricular systolic pressure at 28 mmHg.  Moderate biatrial dilation with no significant valvular abnormalities.  Stress test also ordered pending results.  Cardiology was consulted due to new onset atrial fibrillation with RVR.  Per recommendations from cardiology, DOAC for CVA prevention which [id number] [id number], follow-up TTE and MPI; choice of antiarrhythmia medication pending results.  Anticipated GARRY/DCCV tomorrow with consideration for SGLT2i at discharge.        Interval Problem Update  -Patient seen and examined.  Discussed with patient recommendations from cardiology and is agreeable at this time.  Also discussed with patient echocardiogram results and pending stress test.  Patient  to be n.p.o. starting midnight for cardioversion tomorrow.  Plan of care: Continue telemetry monitoring; n.p.o. at midnight for anticipated cardioversion in a.m. with cardiology  -Disposition: Anticipated to stay overnight until cardioversion is done and further recommendations from cardiology has been made  -Lab work: Reviewed; expected  -VSS at this time    I have discussed this patient's plan of care and discharge plan at IDT rounds today with Case Management, Nursing, Nursing leadership, and other members of the IDT team.    Consultants/Specialty  cardiology Dr. Kee    Code Status  Full Code    Disposition  Patient is not medically cleared for discharge.   Anticipate discharge to to home with close outpatient follow-up.  I have placed the appropriate orders for post-discharge needs.    Review of Systems  Review of Systems   Constitutional:  Positive for malaise/fatigue. Negative for chills and fever.   HENT: Negative.     Eyes: Negative.    Respiratory: Negative.     Cardiovascular:  Positive for chest pain and palpitations.   Gastrointestinal: Negative.    Genitourinary: Negative.    Musculoskeletal:  Positive for myalgias.   Skin: Negative.    Neurological: Negative.    Endo/Heme/Allergies: Negative.    Psychiatric/Behavioral: Negative.        Physical Exam  Temp:  [36.2 °C (97.1 °F)-36.9 °C (98.5 °F)] 36.6 °C (97.9 °F)  Pulse:  [] 64  Resp:  [16-19] 18  BP: (128-170)/() 153/77  SpO2:  [91 %-96 %] 93 %    Physical Exam  Vitals and nursing note reviewed.   HENT:      Head: Normocephalic.      Nose: Nose normal.      Mouth/Throat:      Mouth: Mucous membranes are moist.      Pharynx: Oropharynx is clear.   Eyes:      Pupils: Pupils are equal, round, and reactive to light.   Cardiovascular:      Rate and Rhythm: Normal rate and regular rhythm.      Pulses: Normal pulses.      Heart sounds: Normal heart sounds.   Pulmonary:      Effort: Pulmonary effort is normal.      Breath sounds: Normal  breath sounds.   Abdominal:      General: Bowel sounds are normal.      Palpations: Abdomen is soft.   Musculoskeletal:         General: Tenderness present.      Cervical back: Normal range of motion and neck supple.   Skin:     General: Skin is dry.      Capillary Refill: Capillary refill takes 2 to 3 seconds.   Neurological:      Mental Status: She is alert. Mental status is at baseline.       Fluids  No intake or output data in the 24 hours ending 05/09/23 1526    Laboratory  Recent Labs     05/08/23  1647 05/09/23  0022   WBC 9.1 8.5   RBC 4.70 4.17*   HEMOGLOBIN 14.9 13.1   HEMATOCRIT 44.9 38.9   MCV 95.5 93.3   MCH 31.7 31.4   MCHC 33.2* 33.7   RDW 48.5 47.0   PLATELETCT 352 300   MPV 10.3 10.0     Recent Labs     05/08/23  1647 05/09/23  0022   SODIUM 135 137   POTASSIUM 4.1 3.8   CHLORIDE 98 99   CO2 25 27   GLUCOSE 113* 124*   BUN 13 13   CREATININE 0.71 0.57   CALCIUM 9.7 9.2     Recent Labs     05/08/23  1647   APTT 30.5   INR 1.06         Recent Labs     05/09/23  0022   TRIGLYCERIDE 59   HDL 51   LDL 76       Imaging  EC-ECHOCARDIOGRAM COMPLETE W/O CONT   Final Result      DX-CHEST-PORTABLE (1 VIEW)   Final Result      Enlarged cardiac silhouette without evidence of acute disease.      NM-CARDIAC STRESS TEST    (Results Pending)   EC-GARRY W/O CONT    (Results Pending)   CL-CARDIOVERSION    (Results Pending)        Assessment/Plan  * Chest pain- (present on admission)  Assessment & Plan  -Likely secondary to A-fib  -Rule out ACS  -Trend CE, EKG  -As needed nitro SL, morphine  -Check echo, MPS    Asthma  Assessment & Plan  -Singulair  -Not in acute exacerbation    Diabetes (HCC)  Assessment & Plan  -ISS    Hyperlipidemia  Assessment & Plan  -Statin    Hypertension  Assessment & Plan  -Continue home regimen    Class 3 severe obesity in adult ([facility])  Assessment & Plan  -Diet and lifestyle modification  Body mass index is 40.92 kg/m².      Atrial fibrillation with RVR ([facility])  Assessment & Plan  -RVR  resolved  -As in pafib  -Target K>4.0 and Mg>2.0  -Cardiology consulted: Planned cardioversion tomorrow 5/10    Paroxysmal atrial fibrillation (HCC)  Assessment & Plan  -New onset of A-fib  -Rate: cardiazem -- titrate up as tolerated  Anticoagulation:  CHADVASC score: 3  --- Discussed risk and benefit of starting anticoagulation for stroke prevention versus bleeding risk  --- After discussing with patient, patient agreed and elected to proceed with anticoagulation  --- Started on Eliquis    Consult cardiology - Dr. Kee  -Cardioversion tomorrow         VTE prophylaxis: therapeutic anticoagulation with Eliquis    I have performed a physical exam and reviewed and updated ROS and Plan today (5/9/2023). In review of yesterday's note (5/8/2023), there are no changes except as documented above.    I, BHARTI Jamil performed a substantiated portion of the service face-to-face with same patient on the same date of service INDEPENDENTLY from MD FOR 22 MINUTES.  I was personally involved in reviewing and conducting the medical decision making, including the information as described above.    ================================================================================================================================================================================================================  Please note that this dictation was created using voice recognition software. I have made every reasonable attempt to correct obvious errors, but there may be errors of grammar and possibly content that I did not discover before finalizing the note.    Electronically signed by:  Dr. JAY De La Fuente, DNP, APRN, FNP-C  Hospitalist Services  [facility]  [phone]  [email or web address]  05/09/23                  [phone]

## 2023-05-09 NOTE — H&P
Hospital Medicine History & Physical Note    Date of Service  5/8/2023    Primary Care Physician  No primary care provider on file.    Consultants  None    Code Status  Full Code    Chief Complaint  Chief Complaint   Patient presents with    Hypertension    Sent by MD Urrutia at [facility] appointment and found to have hypertension/abnormal ekg and sent to [facility] for further eval. Pt reports taking high blood pressure medication.    Shortness of Breath     EKG shows a-fib.       History of Presenting Illness  Jeanna Santana is a 62 y.o. female with history of CAD, hypertension, hyperlipidemia, diabetes who presented 5/8/2023 with evaluation for arrhythmia.  Patient was seen by ophthalmology office for elective cataract surgery.  While at the office, noted to have tachycardia, EKG was done, due to concerning arrhythmia, patient was subsequently referred to ER for evaluation.  In ER, patient was found to be in a state of atrial fibrillation RVR with rate as high as 140s.  It appears that she was given Cardizem oral 60 mg time, and heart rate now in 80-100s.  Patient also complained of chest pain, palpitation, shortness of breath at this time.  Medicine service was asked to admit patient for ACS rule out.  Admitted to medicine service for further evaluation and treatment.    I discussed the plan of care with patient and bedside RN.    Review of Systems  Review of Systems   Constitutional:  Negative for chills and fever.   HENT:  Negative for hearing loss and tinnitus.    Eyes:  Negative for blurred vision and double vision.   Respiratory:  Positive for shortness of breath.    Cardiovascular:  Positive for chest pain and palpitations.   Gastrointestinal:  Negative for abdominal pain, heartburn, nausea and vomiting.   Genitourinary:  Negative for dysuria and hematuria.   Musculoskeletal:  Negative for joint pain and myalgias.   Skin:  Negative for itching and rash.   Neurological:  Negative for dizziness and headaches.    Endo/Heme/Allergies:  Negative for environmental allergies. Does not bruise/bleed easily.   Psychiatric/Behavioral:  Negative for depression and substance abuse.    All other systems reviewed and are negative.    Past Medical History   has a past medical history of Diabetes (HCC), Hyperlipidemia, and Hypertension.    Surgical History   has no past surgical history on file.     Family History  family history is not on file.   Family history reviewed with patient. There is family history that is pertinent to the chief complaint.     Social History   reports that she has never smoked. She has never used smokeless tobacco. She reports that she does not drink alcohol and does not use drugs.    Allergies  Allergies   Allergen Reactions    Daptomycin Unspecified     Unknown childhood reaction    Bactrim Ds        Medications  None       Physical Exam  Temp:  [36.2 °C (97.1 °F)-36.9 °C (98.5 °F)] 36.9 °C (98.5 °F)  Pulse:  [] 94  Resp:  [16-19] 17  BP: (137-170)/() 138/73  SpO2:  [93 %-96 %] 95 %  Blood Pressure: (!) 153/92   Temperature: 36.4 °C (97.6 °F)   Pulse: (!) 102   Respiration: 16   Pulse Oximetry: 95 %       Physical Exam  Vitals and nursing note reviewed.   Constitutional:       General: She is not in acute distress.     Appearance: She is obese.   HENT:      Head: Normocephalic and atraumatic.      Nose: Nose normal.      Mouth/Throat:      Mouth: Mucous membranes are moist.      Pharynx: Oropharynx is clear.   Eyes:      General: No scleral icterus.     Extraocular Movements: Extraocular movements intact.   Cardiovascular:      Rate and Rhythm: Tachycardia present. Rhythm irregular.      Pulses: Normal pulses.      Heart sounds:     No friction rub.   Pulmonary:      Effort: No respiratory distress.      Breath sounds: No wheezing or rales.   Chest:      Chest wall: No tenderness.   Abdominal:      General: There is distension.      Tenderness: There is no abdominal tenderness. There is no  guarding or rebound.   Musculoskeletal:         General: No swelling or tenderness. Normal range of motion.      Cervical back: Neck supple. No tenderness.   Skin:     General: Skin is warm and dry.      Capillary Refill: Capillary refill takes less than 2 seconds.   Neurological:      General: No focal deficit present.      Mental Status: She is alert and oriented to person, place, and time.   Psychiatric:         Mood and Affect: Mood normal.       Laboratory:  Recent Labs     05/08/23  1647   WBC 9.1   RBC 4.70   HEMOGLOBIN 14.9   HEMATOCRIT 44.9   MCV 95.5   MCH 31.7   MCHC 33.2*   RDW 48.5   PLATELETCT 352   MPV 10.3     Recent Labs     05/08/23  1647   SODIUM 135   POTASSIUM 4.1   CHLORIDE 98   CO2 25   GLUCOSE 113*   BUN 13   CREATININE 0.71   CALCIUM 9.7     Recent Labs     05/08/23  1647   ALTSGPT 14   ASTSGOT 18   ALKPHOSPHAT 77   TBILIRUBIN 0.4   GLUCOSE 113*     Recent Labs     05/08/23  1647   APTT 30.5   INR 1.06     Recent Labs     05/08/23  1647   NTPROBNP 1284*         Recent Labs     05/08/23  1647   TROPONINT 8       Imaging:  DX-CHEST-PORTABLE (1 VIEW)   Final Result      Enlarged cardiac silhouette without evidence of acute disease.      EC-ECHOCARDIOGRAM COMPLETE W/O CONT    (Results Pending)   NM-CARDIAC STRESS TEST    (Results Pending)       X-Ray:  I have personally reviewed the images and compared with prior images.  EKG:  I have personally reviewed the images and compared with prior images.    Assessment/Plan:  Justification for Admission Status  I anticipate this patient is appropriate for observation status at this time.        * Chest pain- (present on admission)  Assessment & Plan  Likely secondary to A-fib  Rule out ACS  Trend CE, EKG  As needed nitro SL, morphine  Check echo, MPS    Atrial fibrillation with RVR (HCC)  Assessment & Plan  RVR resolved  As in pafib    Target K>4.0 and Mg>2.0    Paroxysmal atrial fibrillation (HCC)  Assessment & Plan  New onset of A-fib  Rate: cardiazem  -- titrate up as tolerated    Anticoagulation:  CHADVASC score: 4  --- Discussed risk and benefit of starting anticoagulation for stroke prevention versus bleeding risk  --- After discussing with patient, patient agreed and elected to proceed with anticoagulation  --- Started on Eliquis    Consult cardiology in a.m.or arrange outpatient cardiology follow-up    Asthma  Assessment & Plan  Singulair  Not in acute exacerbation    Diabetes (HCC)  Assessment & Plan  ISS    Hyperlipidemia  Assessment & Plan  Statin    Hypertension  Assessment & Plan  Continue home regimen    Class 3 severe obesity in adult (HCC)  Assessment & Plan  Diet and lifestyle modification  Body mass index is 40.92 kg/m².          VTE prophylaxis: Eliquis

## 2023-05-09 NOTE — PROGRESS NOTES
4 Eyes Skin Assessment Completed by PATRICIA Muir and PATRICIA Barron.    Head WDL  Ears WDL  Nose WDL  Mouth WDL  Neck WDL  Breast/Chest WDL  Shoulder Blades WDL  Spine WDL  (R) Arm/Elbow/Hand WDL  (L) Arm/Elbow/Hand WDL  Abdomen WDL  Groin WDL  Scrotum/Coccyx/Buttocks WDL  (R) Leg WDL  (L) Leg WDL  (R) Heel/Foot/Toe WDL  (L) Heel/Foot/Toe WDL          Devices In Places Tele Box and Pulse Ox      Interventions In Place Pillows    Possible Skin Injury No    Pictures Uploaded Into Epic N/A  Wound Consult Placed N/A  RN Wound Prevention Protocol Ordered No

## 2023-05-09 NOTE — CONSULTS
CARDIOLOGY CONSULTATION NOTE      Date of Consultation: 5/9/2023  Consulting Provider: Sid Garcia MD    Patient Name: Jeanna Santana    YOB: 1960  MRN: [id number]    Reason for Consultation:   New onset Atrial Fibrillation w/ RVR    History of Present Illness:   Jeanna Santana is a 62 year-old female with a past medical history of DM2, HTN, HLD, and asthma that presented 05/08/23 after found to be in Afib w/ RVR prior to cataract surgery, as well as dyspnea on exertion and L sided chest pain and sent to ED. EKG showing Afib w/ RVR  with V4-V5 STD. CXR showing cardiomegaly. BNP 1284. Trop 8 -> 11. Received diltiazem 60mg PO x1 and 30mg PO x1 with subsequent rate control; repeat EKG showing Afib with HR 66 and resolution of previous STD.    Patient states that they were having preoperative vitals done prior to L cataract surgery yesterday, when she was noted to be hypertensive and in Afib w/ RVR. States that they have been feeling intermittent palpitations and L sided chest pain for the past 2-3 weeks, with dyspnea on exertion during the period as well. Denies orthopnea or lower extremity swelling. Denies alcohol or drug usage. States asthma is controlled with montelukast. Denies nausea, vomiting, fever, or chills.    Medical History:     Past Medical History:   Diagnosis Date    Diabetes (HCC)     Hyperlipidemia     Hypertension        Surgical History:   History reviewed. No pertinent surgical history.    Family History:   History reviewed. No pertinent family history.    Social History:   The patient is a 17py smoker; quit 1990    Medications and Allergies:     Current Facility-Administered Medications   Medication Dose Route Frequency Provider Last Rate Last Admin    lisinopril (PRINIVIL) tablet 20 mg  20 mg Oral Q DAY Sid Garcia M.D.   20 mg at 05/09/23 0514    And    hydroCHLOROthiazide (HYDRODIURIL) tablet 25 mg  25 mg Oral Q DAY Sid Garcia M.D.   25 mg at 05/09/23 0514     dilTIAZem (Cardizem) tablet 90 mg  90 mg Oral Q6HRS Cj Espinoza M.D.        senna-docusate (PERICOLACE or SENOKOT S) 8.6-50 MG per tablet 2 Tablet  2 Tablet Oral BID Sid Garcia M.D.        And    polyethylene glycol/lytes (MIRALAX) PACKET 1 Packet  1 Packet Oral QDAY PRN Sid Garcia M.D.        And    magnesium hydroxide (MILK OF MAGNESIA) suspension 30 mL  30 mL Oral QDAY PRN Sid Garcia M.D.        And    bisacodyl (DULCOLAX) suppository 10 mg  10 mg Rectal QDAY PRN Sid Garcia M.D.        acetaminophen (Tylenol) tablet 650 mg  650 mg Oral Q6HRS PRN Sid Garcia M.D.        labetalol (NORMODYNE/TRANDATE) injection 10 mg  10 mg Intravenous Q4HRS PRN Sid Garcia M.D.        ondansetron (ZOFRAN) syringe/vial injection 4 mg  4 mg Intravenous Q4HRS PRN Sid Garcia M.D.        ondansetron (ZOFRAN ODT) dispertab 4 mg  4 mg Oral Q4HRS PRN Sid Garcia M.D.        promethazine (PHENERGAN) tablet 12.5-25 mg  12.5-25 mg Oral Q4HRS PRN Sid Garcia M.D.        promethazine (PHENERGAN) suppository 12.5-25 mg  12.5-25 mg Rectal Q4HRS PRN Sid Garcia M.D.        prochlorperazine (COMPAZINE) injection 5-10 mg  5-10 mg Intravenous Q4HRS PRN Sid Garcia M.D.        dilTIAZem (Cardizem) injection 10 mg  10 mg Intravenous Q4HRS PRN Sid Garcia M.D.        apixaban (ELIQUIS) tablet 5 mg  5 mg Oral BID Sid Garcia M.D.        atorvastatin (LIPITOR) tablet 20 mg  20 mg Oral QHS Sid Garcia M.D.        furosemide (LASIX) tablet 40 mg  40 mg Oral DAILY Sid Garcia M.D.   40 mg at 05/09/23 0514    montelukast (SINGULAIR) tablet 10 mg  10 mg Oral QHS Sid Garcia M.D.        omeprazole (PRILOSEC) capsule 20 mg  20 mg Oral DAILY Sid Garcia M.D.   20 mg at 05/09/23 0514    traZODone (DESYREL) tablet 50 mg  50 mg Oral QHS Sid Garcia M.D.        insulin regular (HumuLIN R,NovoLIN R) injection  2-9 Units Subcutaneous 4X/DAY MEGGAN Garcia M.D.        And    dextrose 10 % BOLUS 25 g  25 g  "Intravenous Q15 MIN PRN Sid Garcia M.D.        ipratropium (ATROVENT) 0.02 % nebulizer solution 0.5 mg  0.5 mg Nebulization Q4HRS PRN Sid Garcia M.D.           Allergies   Allergen Reactions    Daptomycin Unspecified     Unknown childhood reaction    Bactrim Ds        Review of Systems:   A pertinent review of systems was performed and was unremarkable except as per HPI above.    Vital Signs:   BP (!) 141/88   Pulse 60   Temp 36.2 °C (97.2 °F) (Temporal)   Resp 18   Ht 1.676 m (5' 6\")   Wt 115 kg (253 lb 8.5 oz)   SpO2 95%   BMI 40.92 kg/m²   Vitals:    05/09/23 0500 05/09/23 0800 05/09/23 0900 05/09/23 1000   BP: 128/69 (!) 141/88     Pulse: (!) 103 76 (!) 139 60   Resp:       Temp:       TempSrc:       SpO2: 91% 92% 93% 95%   Weight:       Height:         Body mass index is 40.92 kg/m².  Oxygen Therapy:  Pulse Oximetry: 95 %, O2 (LPM): 0, O2 Delivery Device: None - Room Air    Physical Examination:   General: Well-appearing, no acute distress  Eyes: Extraocular movements intact, anicteric, +L dilated pupil  HENT: No jugular venous distension  Pulmonary: Normal respiratory effort, clear to auscultation bilaterally  Cardiovascular: Regular rate and rhythm, no murmurs or gallops appreciated  Gastrointestinal: Soft, non-tender, non-distended  Extremities: Warm and well perfused, no lower extremity edema  Neurological: Alert and oriented, no gross focal motor deficits  Psychiatric: Normal affect    Laboratories:   Estimated Creatinine Clearance: 128.4 mL/min (by C-G formula based on SCr of 0.57 mg/dL).  Recent Labs     05/08/23 1647 05/09/23  0022   CREATININE 0.71 0.57   BUN 13 13   POTASSIUM 4.1 3.8   SODIUM 135 137   CALCIUM 9.7 9.2   MAGNESIUM 1.7 1.8   CO2 25 27   ALBUMIN 4.5 3.8     Recent Labs     05/08/23  1647 05/09/23  0022   GLUCOSE 113* 124*     Recent Labs     05/08/23 1647   ASTSGOT 18   ALTSGPT 14   ALKPHOSPHAT 77   INR 1.06     Recent Labs     05/08/23  1647 05/09/23  0022   WBC 9.1 8.5 "   HEMOGLOBIN 14.9 13.1   PLATELETCT 352 300     Recent Labs     05/08/23  1647 05/09/23  0022   TROPONINT 8 11   NTPROBNP 1284*  --    HBA1C 6.0*  --      Lab Results   Component Value Date/Time    LDL 76 05/09/2023 0022     Lab Results   Component Value Date/Time    HDL 51 05/09/2023 0022       Lab Results   Component Value Date/Time    TRIGLYCERIDE 59 05/09/2023 0022       Lab Results   Component Value Date/Time    CHOLSTRLTOT 139 05/09/2023 0022       Studies:   Echocardiography  TTE pending    Stress Testing  Treadmill stress test pending    X-ray/CT/MRI  CXR neg    Electrophysiology  EKG 05/08/23 showing Afib w/ RVR  with V4-V5 STD.   EKG 05/09/23 showing Afib with HR 66 and resolution of previous STD.    Assessment and Recommendations:   Jeanna Santana is a 62 year-old female with a past medical history of DM2, HTN, HLD, and asthma with new onset atrial fibrillation w/ RVR and 2-3 week history of LABOY. Received rate control after diltiazem 60mg PO x1 and 30mg PO x1. [id number] score of 3, already started on apixaban. Will have titration of diltiazem, pending TTE and stress test, with reassessment tomorrow for possible GARRY/CV if not converted to NSR.    #Atrial fibrillation w/ RVR  #Dyspnea on exertion  #HTN  #HLD  -Pending TTE and stress test  -Order TSH  -Start on diltiazem 90mg PO q6h  -Continue apixaban 5mg bid; started during this admission  -Replete K>4 and Mg>2  -NPO at midnight for possible cardioversion  -Will likely need ZioPatch on discharge    Cj Espinoza MD  PGY-2 IM Resident  [facility]

## 2023-05-09 NOTE — ED NOTES
Bedside report done with Audi RN. Pt on vitals monitor. Rounded on pt. Pt resting comfortably in bed at this time. On vitals monitor. Respirations equal and unlabored. Vitals signs stable. No acute distress at this time. Call light within reach.

## 2023-05-09 NOTE — ED PROVIDER NOTES
ER Provider Note    Scribed for Ramon Herring D.O. by Emily Vo. 5/8/2023  5:46 PM    Primary Care Provider: No primary care provider noted.     CHIEF COMPLAINT  Chief Complaint   Patient presents with    Hypertension    Sent by MD Urrutia at [facility] appointment and found to have hypertension/abnormal ekg and sent to [facility] for further eval. Pt reports taking high blood pressure medication.    Shortness of Breath     EKG shows a-fib.       HPI/ROS  LIMITATION TO HISTORY   None   OUTSIDE HISTORIAN(S):   endorses history.     Jeanna Santana is a 62 y.o. female who presents to the Emergency Department for A-fib onset today. She states that she was supposed to have cataract surgery today in her left eye. She was told that her blood pressure and pulse were both high so an EKG was done and she was sent to the ED for A-fib. She states that she normally takes medicine for her hypertension but she did not this morning due to her surgery. She has associated shortness of breath on exertion and headaches as well as left sided intermittent chest pain. Her chest pain is non radiating and non exertional. She denies any current chest pain, swelling in her arms or legs, palpitations, lightheadedness, or anxiety. She also denies any chest tightness. She states she has no personal history of heart problems but endorses a family history. She is pre-diabetic and has asthma. She has a history of a hernia surgery in January.      ROS as per HPI.    PAST MEDICAL HISTORY  Past Medical History:   Diagnosis Date    Diabetes (HCC)     Hyperlipidemia     Hypertension        SURGICAL HISTORY  History reviewed. No pertinent surgical history.    FAMILY HISTORY  History reviewed. No pertinent family history.    SOCIAL HISTORY   reports that she has never smoked. She has never used smokeless tobacco. She reports that she does not drink alcohol and does not use drugs.    CURRENT MEDICATIONS  No current outpatient medications  "    ALLERGIES  Bactrim ds    PHYSICAL EXAM  BP (!) 157/83   Pulse (!) 144   Temp 36.4 °C (97.6 °F) (Temporal)   Resp 16   Ht 1.676 m (5' 6\")   Wt 113 kg (249 lb 1.9 oz)   SpO2 94%   BMI 40.21 kg/m²     General: No acute distress. BMI greater than 30  HENT: Normocephalic, Mucus membranes are moist.   Chest: Lungs have even and unlabored respirations, Clear to auscultation.   Cardiovascular: tachycardic rate and regular rhythm, No peripheral cyanosis.  Abdomen: Non distended.  Neuro: Awake, Conversive, Able to relay recent events.  Psychiatric: Calm and cooperative.     EXTERNAL RECORDS REVIEWED  No previous records here for previous evaluation.     INITIAL ASSESSMENT  Patient with no history of cardiac disease has new onset Afib with RDR. Intermittent episodes of chest pain and dyspnea on exertion. Evaluation for cardiac injury. Patient placed on monitor. Patient given rate reducing medication     ED Observation Status? No; Patient does not meet criteria for ED Observation.     DIAGNOSTIC STUDIES    Labs:   Results for orders placed or performed during the hospital encounter of 05/08/23   CBC with Differential   Result Value Ref Range    WBC 9.1 4.8 - 10.8 K/uL    RBC 4.70 4.20 - 5.40 M/uL    Hemoglobin 14.9 12.0 - 16.0 g/dL    Hematocrit 44.9 37.0 - 47.0 %    MCV 95.5 81.4 - 97.8 fL    MCH 31.7 27.0 - 33.0 pg    MCHC 33.2 (L) 33.6 - 35.0 g/dL    RDW 48.5 35.9 - 50.0 fL    Platelet Count 352 164 - 446 K/uL    MPV 10.3 9.0 - 12.9 fL    Neutrophils-Polys 66.30 44.00 - 72.00 %    Lymphocytes 24.80 22.00 - 41.00 %    Monocytes 7.00 0.00 - 13.40 %    Eosinophils 0.80 0.00 - 6.90 %    Basophils 0.80 0.00 - 1.80 %    Immature Granulocytes 0.30 0.00 - 0.90 %    Nucleated RBC 0.00 /100 WBC    Neutrophils (Absolute) 6.05 2.00 - 7.15 K/uL    Lymphs (Absolute) 2.26 1.00 - 4.80 K/uL    Monos (Absolute) 0.64 0.00 - 0.85 K/uL    Eos (Absolute) 0.07 0.00 - 0.51 K/uL    Baso (Absolute) 0.07 0.00 - 0.12 K/uL    Immature " Granulocytes (abs) 0.03 0.00 - 0.11 K/uL    NRBC (Absolute) 0.00 K/uL   Comp Metabolic Panel   Result Value Ref Range    Sodium 135 135 - 145 mmol/L    Potassium 4.1 3.6 - 5.5 mmol/L    Chloride 98 96 - 112 mmol/L    Co2 25 20 - 33 mmol/L    Anion Gap 12.0 7.0 - 16.0    Glucose 113 (H) 65 - 99 mg/dL    Bun 13 8 - 22 mg/dL    Creatinine 0.71 0.50 - 1.40 mg/dL    Calcium 9.7 8.5 - 10.5 mg/dL    AST(SGOT) 18 12 - 45 U/L    ALT(SGPT) 14 2 - 50 U/L    Alkaline Phosphatase 77 30 - 99 U/L    Total Bilirubin 0.4 0.1 - 1.5 mg/dL    Albumin 4.5 3.2 - 4.9 g/dL    Total Protein 7.2 6.0 - 8.2 g/dL    Globulin 2.7 1.9 - 3.5 g/dL    A-G Ratio 1.7 g/dL   CORRECTED CALCIUM   Result Value Ref Range    Correct Calcium 9.3 8.5 - 10.5 mg/dL   ESTIMATED GFR   Result Value Ref Range    GFR (CKD-EPI) 94 >60 mL/min/1.73 m 2   APTT   Result Value Ref Range    APTT 30.5 24.7 - 36.0 sec   PROTHROMBIN TIME (INR)   Result Value Ref Range    PT 13.7 12.0 - 14.6 sec    INR 1.06 0.87 - 1.13   TROPONIN   Result Value Ref Range    Troponin T 8 6 - 19 ng/L   EKG   Result Value Ref Range    Report       [facility]    Test Date:  2023  Pt Name:    LISE NASH            Department: ER  MRN:        [id number]                      Room:  Gender:     Female                       Technician: [id number]  :        1960                   Requested By:ER TRIAGE PROTOCOL  Order #:    [id number]                    Reading MD:    Measurements  Intervals                                Axis  Rate:       138                          P:  FL:                                      QRS:        60  QRSD:       131                          T:          20  QT:         363  QTc:        550    Interpretive Statements  Atrial fibrillation  Nonspecific intraventricular conduction delay  Minimal ST depression, anterolateral leads  Compared to ECG 2022 13:40:32  ST (T wave) deviation now present  Sinus rhythm no longer  present          EKG:   I have independently interpreted the above EKG.    Radiology:   The attending emergency physician has independently interpreted the diagnostic imaging associated with this visit and am waiting the final reading from the radiologist.   Preliminary interpretation is as follows: Chest x-ray no acute disease  Radiologist interpretation:   DX-CHEST-PORTABLE (1 VIEW)   Final Result      Enlarged cardiac silhouette without evidence of acute disease.      EC-ECHOCARDIOGRAM COMPLETE W/O CONT    (Results Pending)   NM-CARDIAC STRESS TEST    (Results Pending)      COURSE & MEDICAL DECISION MAKING     COURSE AND PLAN  5:46 PM - Patient seen and examined at bedside. Discussed plan of care, including labs and imaging. Patient agrees to the plan of care. The patient will be medicated with Cardizem injection 25 mg, xarelto tablet 20 mg. Ordered for EKG, CMP, CBC w/ Diff, Estimated GFR, Corrected Calcium, Troponin, Prothrombin time, APTT, DX-Chest to evaluate her symptoms.     7:30 PM- Patient's heart rate is now in the 80's. It does jump up to 125 but then lowers prior to IV so none will be given.     8:02 PM I discussed the patient's case and the above findings with Dr. Garcia (Hospitalist) who agrees to evaluate the patient for hospitalization.     The patient remains critically ill.  Critical care time = 30 minutes in directly providing and coordinating critical care and extensive data review.  No time overlap and excludes procedures.     ED Summary: Patient presents with episode of hypertension while she was at the ophthalmologist, they found her to be tachycardic.  Her EKG shows atrial fibrillation with RVR with a heart rate of 144.  She does have episodes of exertional dyspnea and chest discomfort.    Patient was seen initially in the rounds, she was brought to a bed.  IV Cardizem was ordered along with p.o. medications.  The patient had no further shortness of breath or chest pain.  On reevaluation  she did not receive the IV Cardizem but her p.o. Cardizem did to control her heart rate.  With this the patient will be admitted for further evaluation and treatment.    DISPOSITION AND DISCUSSIONS  I have discussed management of the patient with the following physicians and JEANNINE's:  Dr. Garcia (Hospitalist)    Discussion of management with other [facility] or appropriate source(s): None     DISPOSITION:  Patient will be hospitalized by Dr. Garcia (Hospitalist) in gaurded condition.     FINAL DIAGNOSIS  1. Precordial chest pain    2. Exertional dyspnea    3. New onset a-fib (HCC)    4. Atrial fibrillation with RVR (HCC)         Emily REYES (Scribe), am scribing for, and in the presence of, Ramon Herring D.O..    Electronically signed by: Emily Vo (Scribe), 5/8/2023    IRamon D.O. personally performed the services described in this documentation, as scribed by Emily Vo in my presence, and it is both accurate and complete.    The note accurately reflects work and decisions made by me.  Ramon Herring D.O.  5/8/2023  9:28 PM

## 2023-05-09 NOTE — ASSESSMENT & PLAN NOTE
-RVR resolved  -As in pafib  -Target K>4.0 and Mg>2.0  -Cardiology consulted: Planned cardioversion tomorrow 5/10

## 2023-05-09 NOTE — ED NOTES
Pt transferred out of ED at this time with Isadora GOMEZ. Pt is A&Ox4, with stable vital signs and no apparent distress upon transfer. Pt transferred on tele monitor  All paperwork and personal belongings sent with pt.

## 2023-05-10 LAB
ANION GAP SERPL CALC-SCNC: 14 MMOL/L (ref 7–16)
BUN SERPL-MCNC: 11 MG/DL (ref 8–22)
CALCIUM SERPL-MCNC: 9.2 MG/DL (ref 8.5–10.5)
CHLORIDE SERPL-SCNC: 100 MMOL/L (ref 96–112)
CO2 SERPL-SCNC: 27 MMOL/L (ref 20–33)
CREAT SERPL-MCNC: 0.62 MG/DL (ref 0.5–1.4)
ERYTHROCYTE [DISTWIDTH] IN BLOOD BY AUTOMATED COUNT: 46.4 FL (ref 35.9–50)
GFR SERPLBLD CREATININE-BSD FMLA CKD-EPI: 99 ML/MIN/1.73 M 2
GLUCOSE BLD STRIP.AUTO-MCNC: 125 MG/DL (ref 65–99)
GLUCOSE BLD STRIP.AUTO-MCNC: 139 MG/DL (ref 65–99)
GLUCOSE BLD STRIP.AUTO-MCNC: 154 MG/DL (ref 65–99)
GLUCOSE SERPL-MCNC: 120 MG/DL (ref 65–99)
HCT VFR BLD AUTO: 43.3 % (ref 37–47)
HGB BLD-MCNC: 14.3 G/DL (ref 12–16)
MCH RBC QN AUTO: 30.8 PG (ref 27–33)
MCHC RBC AUTO-ENTMCNC: 33 G/DL (ref 33.6–35)
MCV RBC AUTO: 93.3 FL (ref 81.4–97.8)
PLATELET # BLD AUTO: 328 K/UL (ref 164–446)
PMV BLD AUTO: 10.2 FL (ref 9–12.9)
POTASSIUM SERPL-SCNC: 3.9 MMOL/L (ref 3.6–5.5)
RBC # BLD AUTO: 4.64 M/UL (ref 4.2–5.4)
SODIUM SERPL-SCNC: 141 MMOL/L (ref 135–145)
WBC # BLD AUTO: 9.1 K/UL (ref 4.8–10.8)

## 2023-05-10 PROCEDURE — A9270 NON-COVERED ITEM OR SERVICE: HCPCS | Mod: UD | Performed by: INTERNAL MEDICINE

## 2023-05-10 PROCEDURE — 700102 HCHG RX REV CODE 250 W/ 637 OVERRIDE(OP): Mod: UD | Performed by: STUDENT IN AN ORGANIZED HEALTH CARE EDUCATION/TRAINING PROGRAM

## 2023-05-10 PROCEDURE — 85027 COMPLETE CBC AUTOMATED: CPT

## 2023-05-10 PROCEDURE — A9270 NON-COVERED ITEM OR SERVICE: HCPCS | Mod: UD | Performed by: STUDENT IN AN ORGANIZED HEALTH CARE EDUCATION/TRAINING PROGRAM

## 2023-05-10 PROCEDURE — 99232 SBSQ HOSP IP/OBS MODERATE 35: CPT | Performed by: INTERNAL MEDICINE

## 2023-05-10 PROCEDURE — G0378 HOSPITAL OBSERVATION PER HR: HCPCS

## 2023-05-10 PROCEDURE — 80048 BASIC METABOLIC PNL TOTAL CA: CPT

## 2023-05-10 PROCEDURE — 82962 GLUCOSE BLOOD TEST: CPT | Mod: 91

## 2023-05-10 PROCEDURE — 36415 COLL VENOUS BLD VENIPUNCTURE: CPT

## 2023-05-10 PROCEDURE — 700102 HCHG RX REV CODE 250 W/ 637 OVERRIDE(OP): Mod: UD | Performed by: INTERNAL MEDICINE

## 2023-05-10 PROCEDURE — 99233 SBSQ HOSP IP/OBS HIGH 50: CPT | Mod: FS | Performed by: INTERNAL MEDICINE

## 2023-05-10 RX ORDER — DILTIAZEM HYDROCHLORIDE 60 MG/1
60 TABLET, FILM COATED ORAL EVERY 6 HOURS
Status: DISCONTINUED | OUTPATIENT
Start: 2023-05-10 | End: 2023-05-10

## 2023-05-10 RX ORDER — FLECAINIDE ACETATE 100 MG/1
50 TABLET ORAL TWICE DAILY
Status: DISCONTINUED | OUTPATIENT
Start: 2023-05-10 | End: 2023-05-11 | Stop reason: HOSPADM

## 2023-05-10 RX ADMIN — SENNOSIDES AND DOCUSATE SODIUM 2 TABLET: 50; 8.6 TABLET ORAL at 05:10

## 2023-05-10 RX ADMIN — ATORVASTATIN CALCIUM 20 MG: 20 TABLET, FILM COATED ORAL at 20:17

## 2023-05-10 RX ADMIN — OMEPRAZOLE 20 MG: 20 CAPSULE, DELAYED RELEASE ORAL at 05:10

## 2023-05-10 RX ADMIN — APIXABAN 5 MG: 5 TABLET, FILM COATED ORAL at 05:10

## 2023-05-10 RX ADMIN — APIXABAN 5 MG: 5 TABLET, FILM COATED ORAL at 16:45

## 2023-05-10 RX ADMIN — MONTELUKAST 10 MG: 10 TABLET, FILM COATED ORAL at 20:17

## 2023-05-10 RX ADMIN — LISINOPRIL 20 MG: 20 TABLET ORAL at 05:10

## 2023-05-10 RX ADMIN — DILTIAZEM HYDROCHLORIDE 60 MG: 60 TABLET, FILM COATED ORAL at 14:58

## 2023-05-10 RX ADMIN — DILTIAZEM HYDROCHLORIDE 60 MG: 60 TABLET, FILM COATED ORAL at 23:29

## 2023-05-10 RX ADMIN — FUROSEMIDE 40 MG: 40 TABLET ORAL at 05:10

## 2023-05-10 RX ADMIN — DILTIAZEM HYDROCHLORIDE 60 MG: 60 TABLET, FILM COATED ORAL at 20:18

## 2023-05-10 RX ADMIN — DILTIAZEM HYDROCHLORIDE 60 MG: 60 TABLET, FILM COATED ORAL at 09:11

## 2023-05-10 RX ADMIN — HYDROCHLOROTHIAZIDE 25 MG: 25 TABLET ORAL at 05:10

## 2023-05-10 RX ADMIN — ACETAMINOPHEN 650 MG: 325 TABLET, FILM COATED ORAL at 20:17

## 2023-05-10 RX ADMIN — TRAZODONE HYDROCHLORIDE 50 MG: 50 TABLET ORAL at 20:17

## 2023-05-10 RX ADMIN — FLECAINIDE ACETATE 50 MG: 100 TABLET ORAL at 13:28

## 2023-05-10 ASSESSMENT — ENCOUNTER SYMPTOMS
PALPITATIONS: 1
EYES NEGATIVE: 1
NEUROLOGICAL NEGATIVE: 1
GASTROINTESTINAL NEGATIVE: 1
CHILLS: 0
RESPIRATORY NEGATIVE: 1
PSYCHIATRIC NEGATIVE: 1
MYALGIAS: 1
FEVER: 0

## 2023-05-10 ASSESSMENT — COGNITIVE AND FUNCTIONAL STATUS - GENERAL
DRESSING REGULAR LOWER BODY CLOTHING: A LITTLE
STANDING UP FROM CHAIR USING ARMS: A LITTLE
DAILY ACTIVITIY SCORE: 23
MOBILITY SCORE: 20
SUGGESTED CMS G CODE MODIFIER MOBILITY: CJ
SUGGESTED CMS G CODE MODIFIER DAILY ACTIVITY: CI
WALKING IN HOSPITAL ROOM: A LITTLE
CLIMB 3 TO 5 STEPS WITH RAILING: A LITTLE
MOVING TO AND FROM BED TO CHAIR: A LITTLE

## 2023-05-10 ASSESSMENT — PAIN DESCRIPTION - PAIN TYPE
TYPE: ACUTE PAIN
TYPE: ACUTE PAIN

## 2023-05-10 ASSESSMENT — FIBROSIS 4 INDEX: FIB4 SCORE: 0.94

## 2023-05-10 NOTE — PROGRESS NOTES
4 Eyes Skin Assessment Completed by PATRICIA Nunes and PATRICIA Barlow.    Head WDL  Ears Redness and Blanching  Nose WDL  Mouth WDL  Neck WDL  Breast/Chest Redness and Excoriation  Shoulder Blades WDL  Spine WDL  (R) Arm/Elbow/Hand Redness, Blanching, and Bruising  (L) Arm/Elbow/Hand Redness, Blanching, and Bruising  Abdomen Scar  Groin Redness and Excoriation  Scrotum/Coccyx/Buttocks Redness and Excoriation  (R) Leg Bruising  (L) Leg Bruising  (R) Heel/Foot/Toe Redness, Blanching, and Boggy  (L) Heel/Foot/Toe Redness, Blanching, and Boggy          Devices In Places Tele Box      Interventions In Place Pressure Redistribution Mattress    Possible Skin Injury No    Pictures Uploaded Into Epic N/A  Wound Consult Placed N/A  RN Wound Prevention Protocol Ordered No

## 2023-05-10 NOTE — CARE PLAN
The patient is Stable - Low risk of patient condition declining or worsening    Shift Goals  Clinical Goals: Monitor HR and SPO2  Patient Goals: Rest    Progress made toward(s) clinical / shift goals:      Problem: Hemodynamics  Goal: Patient's hemodynamics, fluid balance and neurologic status will be stable or improve  Outcome: Progressing  Note: Pt becomes tachy with ambulation, non-sustained as HR drops to normal range during rest.        Patient is not progressing towards the following goals:

## 2023-05-10 NOTE — PROGRESS NOTES
CARDIOLOGY PROGRESS NOTE      Date: 5/10/2023      Patient Name: Jeanna Santana    YOB: 1960  MRN: [id number]    Events/Subjective:   Jeanna Santana is a 62 year-old female with a past medical history of DM2, HTN, HLD, and asthma that presented 05/08/23 after found to be in Afib w/ RVR prior to cataract surgery, as well as dyspnea on exertion and L sided chest pain and sent to ED. EKG showing Afib w/ RVR  with V4-V5 STD. CXR showing cardiomegaly. BNP 1284. Trop 8 -> 11. Received diltiazem 60mg PO x1 and 30mg PO x1 with subsequent rate control; repeat EKG showing Afib with HR 66 and resolution of previous STD.     Patient states that they were having preoperative vitals done prior to L cataract surgery yesterday, when she was noted to be hypertensive and in Afib w/ RVR. States that they have been feeling intermittent palpitations and L sided chest pain for the past 2-3 weeks, with dyspnea on exertion during the period as well. Denies orthopnea or lower extremity swelling. Denies alcohol or drug usage. States asthma is controlled with montelukast. Denies nausea, vomiting, fever, or chills.    Interval update:  05/10/23: Patient underwent TTE and MPI yesterday; MPI report pending. TTE showing EF 60-65% with mod biatrial enlargement and RVSP 28mmHg. TSH wnl. Patient with asymptomatic bradycardia into 30s yesterday evening with diltiazem doses held until this morning, with subsequent RVR up to 160s.    Medications:     Scheduled Medications   Medication Dose Frequency    dilTIAZem  60 mg Q6HRS    lisinopril  20 mg Q DAY    And    hydroCHLOROthiazide  25 mg Q DAY    regadenoson  0.4 mg Once    senna-docusate  2 Tablet BID    apixaban  5 mg BID    atorvastatin  20 mg QHS    furosemide  40 mg DAILY    montelukast  10 mg QHS    omeprazole  20 mg DAILY    traZODone  50 mg QHS    insulin regular  2-9 Units 4X/DAY ACHS     Current Outpatient Medications   Medication Instructions    Ascorbic Acid  "(VITAMIN C PO) 1 Tablet, Oral, DAILY    atorvastatin (LIPITOR) 20 mg, Oral, EVERY BEDTIME    docusate sodium (COLACE) 100 mg, Oral, 2 TIMES DAILY    furosemide (LASIX) 40 mg, Oral, DAILY    Garlic (GARLIQUE PO) 1 Capsule, Oral, DAILY    GLUCOSAMINE-CHONDROITIN PO 1 Capsule, Oral, 2 TIMES DAILY    lisinopril-hydrochlorothiazide (PRINZIDE) 20-25 MG per tablet 2 Tablets, Oral, EVERY MORNING    metFORMIN (GLUCOPHAGE) 500 mg, Oral, 2 TIMES DAILY WITH MEALS    montelukast (SINGULAIR) 10 mg, Oral, EVERY BEDTIME    Multiple Vitamins-Minerals (OCUVITE PO) 1 Tablet, Oral, DAILY, Generic OTC Version of Ocuvite    omeprazole (PRILOSEC) 20 mg, Oral, DAILY    traZODone (DESYREL) 50 mg, Oral, EVERY BEDTIME       Vitals:   BP (!) 148/86   Pulse 68   Temp 36.2 °C (97.1 °F) (Temporal)   Resp 16   Ht 1.676 m (5' 6\")   Wt 115 kg (253 lb 8.5 oz)   SpO2 92%    BP Readings from Last 4 Encounters:   05/10/23 (!) 148/86     Wt Readings from Last 4 Encounters:   05/08/23 115 kg (253 lb 8.5 oz)     Body mass index is 40.92 kg/m².    Physical Examination:   General: Well-appearing, no acute distress  Eyes: Extraocular movements intact, anicteric, +L dilated pupil  Neck: Full range of motion, no jugular venous distension  Pulmonary: Normal respiratory effort, clear to auscultation bilaterally  Cardiovascular: +Tachycardic, +irregularly irregular rhythm, no murmurs or gallops appreciated  Extremities: Warm and well perfused, no lower extremity edema  Neurological: Alert and oriented, no gross focal motor deficits    Laboratories:   Estimated Creatinine Clearance: 118.1 mL/min (by C-G formula based on SCr of 0.62 mg/dL).  Recent Labs     05/08/23  1647 05/09/23  0022 05/10/23  0558   CREATININE 0.71 0.57 0.62   BUN 13 13 11   POTASSIUM 4.1 3.8 3.9   SODIUM 135 137 141   CALCIUM 9.7 9.2 9.2   MAGNESIUM 1.7 1.8  --    CO2 25 27 27   ALBUMIN 4.5 3.8  --      Recent Labs     05/08/23  1647 05/09/23  0022 05/10/23  0558   GLUCOSE 113* 124* 120* "     Recent Labs     05/08/23  1647   ASTSGOT 18   ALTSGPT 14   ALKPHOSPHAT 77   INR 1.06     Recent Labs     05/08/23  1647 05/09/23  0022 05/10/23  0558   WBC 9.1 8.5 9.1   HEMOGLOBIN 14.9 13.1 14.3   PLATELETCT 352 300 328     Recent Labs     05/08/23  1647 05/09/23  0022   TROPONINT 8 11   NTPROBNP 1284*  --    HBA1C 6.0*  --      Lab Results   Component Value Date/Time    LDL 76 05/09/2023 0022     Lab Results   Component Value Date/Time    HDL 51 05/09/2023 0022       Lab Results   Component Value Date/Time    TRIGLYCERIDE 59 05/09/2023 0022       Lab Results   Component Value Date/Time    CHOLSTRLTOT 139 05/09/2023 0022       Telemetry:   Afib 30s-160s    Cardiac Studies and Procedures:   Echocardiography  TTE 05/09/23  Normal left ventricular systolic function.  The left ventricular ejection fraction is visually estimated to be 60-  65%.  Diastolic function is difficult to assess with arrhythmia.  The right ventricle is normal in size and systolic function.  Estimated right ventricular systolic pressure is 28 mmHg.  Moderate biatrial dilation.  No significant valvular abnormalities.  Normal inferior vena cava size and inspiratory collapse.     Stress Testing  MPI pending report     X-ray/CT/MRI  CXR neg     Electrophysiology  EKG 05/08/23 showing Afib w/ RVR  with V4-V5 STD.   EKG 05/09/23 showing Afib with HR 66 and resolution of previous STD.    Assessment/Recommendations:   Jeanna Santana is a 62 year-old female with a past medical history of DM2, HTN, HLD, and asthma with new onset atrial fibrillation w/ RVR and 2-3 week history of LABOY. Received rate control after diltiazem 60mg PO x1 and 30mg PO x1. [id number] score of 3, already started on apixaban. TTE showing EF 60-65% with mod biatrial enlargement and RVSP 28mmHg. TSH wnl. Pending MPI report. Will have titration of diltiazem with GARRY/CV inpatient vs. outpatient as has not converted to NSR.     #Atrial fibrillation w/ RVR  #Dyspnea on  exertion  #HTN  #HLD  #Snoring  -Pending MPI report  -Decrease diltiazem from 90mg PO q6h to 60mg PO q6h; titrate to goal HR<110  -Continue apixaban 5mg bid; started during this admission  -Replete K>4 and Mg>2  -NPO since midnight for possible cardioversion; possibly done outpatient  -Will likely need ZioPatch on discharge  -Titrate antihypertensives for goal /80  -Consider outpatient sleep study; STOP-BANG of 4-5    Disposition:   Cardiology will continue to follow.    Please contact us if there are any questions or concerns.    Cj Espinoza MD  PGY-2 IM Resident  [facility]

## 2023-05-10 NOTE — CARE PLAN
Monitor summary  Afib 40s-150s  -/.07/.50      The patient is Watcher - Medium risk of patient condition declining or worsening    Shift Goals  Clinical Goals: Hemodynamic stability  Patient Goals: Rest    Progress made toward(s) clinical / shift goals:        Problem: Knowledge Deficit - Standard  Goal: Patient and family/care givers will demonstrate understanding of plan of care, disease process/condition, diagnostic tests and medications  Outcome: Progressing     Problem: Hemodynamics  Goal: Patient's hemodynamics, fluid balance and neurologic status will be stable or improve  Outcome: Progressing       Patient is not progressing towards the following goals:

## 2023-05-10 NOTE — PROGRESS NOTES
[facility] Medicine Daily Progress Note    Date of Service  5/10/2023    Chief Complaint  Jeanna Santana is a 62 y.o. female admitted 5/8/2023 with arrhythmia noted at the ophthalmology office    Hospital Course  Ms. Jeanna Santana is a 62 y.o. female with history of CAD, hypertension, hyperlipidemia, diabetes who presented 5/8/2023 with evaluation for arrhythmia.      Patient was seen by ophthalmology office for elective cataract surgery.  While at the office, noted to have tachycardia, EKG was done, due to concerning arrhythmia, patient was subsequently referred to ER for evaluation.      In ER, patient was found to be in a state of atrial fibrillation RVR with rate as high as 140s.  It appears that she was given Cardizem oral 60 mg time, and heart rate now in 80-100s.  Patient also complained of chest pain, palpitation, shortness of breath at this time.  Medicine service was asked to admit patient for ACS rule out.  Admitted to medicine service for further evaluation and treatment.    Patient was monitored overnight with no events noted.  Echocardiogram pursued noting LVEF approximately 60-65% with diastolic function difficult to assess due to arrhythmia, right ventricle normal size and systolic function and estimated right ventricular systolic pressure at 28 mmHg.  Moderate biatrial dilation with no significant valvular abnormalities.  Stress test also ordered pending results.  Cardiology was consulted due to new onset atrial fibrillation with RVR.  Per recommendations from cardiology, DOAC for CVA prevention which [id number] 2-3, follow-up TTE and MPI; choice of antiarrhythmia medication pending results.  Anticipated GARRY/DCCV tomorrow with consideration for SGLT2i at discharge.        Interval Problem Update  5/9/2023  -Patient seen and examined.  Discussed with patient recommendations from cardiology and is agreeable at this time.  Also discussed with patient echocardiogram results and pending stress  test.  Patient to be n.p.o. starting midnight for cardioversion tomorrow.  Plan of care: Continue telemetry monitoring; n.p.o. at midnight for anticipated cardioversion in a.m. with cardiology  -Disposition: Anticipated to stay overnight until cardioversion is done and further recommendations from cardiology has been made  -Lab work: Reviewed; expected  -VSS at this time    5/10/2023  -Patient seen and examined.  Discussed with patient echocardiogram results along with MPI results.  Also discussed with patient plans from cardiology standpoint.  Still will need to pursue cardioversion but will possibly happen on 5/11/2023.  -Plan of care: Continue to monitor patient; start n.p.o. at midnight for anticipated cardioversion  -Disposition: Patient will be transferred to telemetry unit; anticipated to stay overnight until cardioversion has been done   -Lab work: Reviewed;  expected  -VSS at this time      I have discussed this patient's plan of care and discharge plan at IDT rounds today with Case Management, Nursing, Nursing leadership, and other members of the IDT team.    Consultants/Specialty  cardiology Dr. Kee    Code Status  Full Code    Disposition  Patient is not medically cleared for discharge.   Anticipate discharge to to home with close outpatient follow-up.  I have placed the appropriate orders for post-discharge needs.    Review of Systems  Review of Systems   Constitutional:  Positive for malaise/fatigue. Negative for chills and fever.   HENT: Negative.     Eyes: Negative.    Respiratory: Negative.     Cardiovascular:  Positive for chest pain and palpitations.   Gastrointestinal: Negative.    Genitourinary: Negative.    Musculoskeletal:  Positive for myalgias.   Skin: Negative.    Neurological: Negative.    Endo/Heme/Allergies: Negative.    Psychiatric/Behavioral: Negative.          Physical Exam  Temp:  [36 °C (96.8 °F)-37.2 °C (98.9 °F)] 36.3 °C (97.4 °F)  Pulse:  [] 62  Resp:  [16-18] 16  BP:  (126-175)/(68-98) 146/84  SpO2:  [91 %-97 %] 92 %    Physical Exam  Vitals and nursing note reviewed.   HENT:      Head: Normocephalic.      Nose: Nose normal.      Mouth/Throat:      Mouth: Mucous membranes are moist.      Pharynx: Oropharynx is clear.   Eyes:      Pupils: Pupils are equal, round, and reactive to light.   Cardiovascular:      Rate and Rhythm: Normal rate and regular rhythm.      Pulses: Normal pulses.      Heart sounds: Normal heart sounds.   Pulmonary:      Effort: Pulmonary effort is normal.      Breath sounds: Normal breath sounds.   Abdominal:      General: Bowel sounds are normal.      Palpations: Abdomen is soft.   Musculoskeletal:         General: Tenderness present.      Cervical back: Normal range of motion and neck supple.   Skin:     General: Skin is dry.      Capillary Refill: Capillary refill takes 2 to 3 seconds.   Neurological:      Mental Status: She is alert. Mental status is at baseline.         Fluids  No intake or output data in the 24 hours ending 05/10/23 1219    Laboratory  Recent Labs     05/08/23  1647 05/09/23  0022 05/10/23  0558   WBC 9.1 8.5 9.1   RBC 4.70 4.17* 4.64   HEMOGLOBIN 14.9 13.1 14.3   HEMATOCRIT 44.9 38.9 43.3   MCV 95.5 93.3 93.3   MCH 31.7 31.4 30.8   MCHC 33.2* 33.7 33.0*   RDW 48.5 47.0 46.4   PLATELETCT 352 300 328   MPV 10.3 10.0 10.2       Recent Labs     05/08/23  1647 05/09/23  0022 05/10/23  0558   SODIUM 135 137 141   POTASSIUM 4.1 3.8 3.9   CHLORIDE 98 99 100   CO2 25 27 27   GLUCOSE 113* 124* 120*   BUN 13 13 11   CREATININE 0.71 0.57 0.62   CALCIUM 9.7 9.2 9.2       Recent Labs     05/08/23  1647   APTT 30.5   INR 1.06           Recent Labs     05/09/23  0022   TRIGLYCERIDE 59   HDL 51   LDL 76         Imaging  NM-CARDIAC STRESS TEST   Final Result      EC-ECHOCARDIOGRAM COMPLETE W/O CONT   Final Result      DX-CHEST-PORTABLE (1 VIEW)   Final Result      Enlarged cardiac silhouette without evidence of acute disease.      EC-GARRY W/O CONT     (Results Pending)   CL-CARDIOVERSION    (Results Pending)          Assessment/Plan  * Chest pain- (present on admission)  Assessment & Plan  -Likely secondary to A-fib  -Rule out ACS  -Trend CE, EKG  -As needed nitro SL, morphine  -Check echo, MPS    Asthma  Assessment & Plan  -Singulair  -Not in acute exacerbation    Diabetes (HCC)  Assessment & Plan  -ISS    Hyperlipidemia  Assessment & Plan  -Statin    Hypertension  Assessment & Plan  -Continue home regimen    Class 3 severe obesity in adult (HCC)  Assessment & Plan  -Diet and lifestyle modification  Body mass index is 40.92 kg/m².      Atrial fibrillation with RVR (HCC)  Assessment & Plan  -RVR resolved  -As in pafib  -Target K>4.0 and Mg>2.0  -Cardiology consulted: Planned cardioversion tomorrow 5/10    Paroxysmal atrial fibrillation (HCC)  Assessment & Plan  -New onset of A-fib  -Rate: cardiazem -- titrate up as tolerated  Anticoagulation:  CHADVASC score: 3  --- Discussed risk and benefit of starting anticoagulation for stroke prevention versus bleeding risk  --- After discussing with patient, patient agreed and elected to proceed with anticoagulation  --- Started on Eliquis    Consult cardiology - Dr. Kee  -Cardioversion tomorrow         VTE prophylaxis: therapeutic anticoagulation with Eliquis    I have performed a physical exam and reviewed and updated ROS and Plan today (5/10/2023). In review of yesterday's note (5/9/2023), there are no changes except as documented above.    ILuzmaria A.P.RAdamNAdam performed a substantiated portion of the service face-to-face with same patient on the same date of service INDEPENDENTLY from MD FOR 11 MINUTES.  I was personally involved in reviewing and conducting the medical decision making, including the information as described  above.    ================================================================================================================================================================================================================  Please note that this dictation was created using voice recognition software. I have made every reasonable attempt to correct obvious errors, but there may be errors of grammar and possibly content that I did not discover before finalizing the note.    Electronically signed by:  Dr. JAY De La Fuente, DNP, APRN, FNP-C  Hospitalist Services  [facility]  [phone]  [email or web address]  05/10/23                 1226

## 2023-05-10 NOTE — PROGRESS NOTES
2200- Notified by monitor tech that patient is experiencing intermittent periods of bradycardia 40s-50s. Patient assessed; remains AOx4, VSS, denies pain or discomfort. Hospitalist Jennifer Gordon paged and made aware. No new orders received. Team would like to be notified if patient becomes symptomatic. Will continue to monitor

## 2023-05-10 NOTE — PROGRESS NOTES
Amira cardiolgy  contacted this RN with update on GARRY. GARRY scheduled 1330 05/10/23.Ok to order diet and NPO at midnight 5/9/23. Orders placed.

## 2023-05-11 ENCOUNTER — APPOINTMENT (OUTPATIENT)
Dept: CARDIOLOGY | Facility: MEDICAL CENTER | Age: 63
End: 2023-05-11
Attending: INTERNAL MEDICINE
Payer: MEDICARE

## 2023-05-11 ENCOUNTER — ANESTHESIA EVENT (OUTPATIENT)
Dept: CARDIOLOGY | Facility: MEDICAL CENTER | Age: 63
End: 2023-05-11
Payer: MEDICARE

## 2023-05-11 ENCOUNTER — ANESTHESIA (OUTPATIENT)
Dept: CARDIOLOGY | Facility: MEDICAL CENTER | Age: 63
End: 2023-05-11
Payer: MEDICARE

## 2023-05-11 VITALS
HEART RATE: 70 BPM | SYSTOLIC BLOOD PRESSURE: 131 MMHG | WEIGHT: 244.71 LBS | HEIGHT: 66 IN | TEMPERATURE: 98.1 F | OXYGEN SATURATION: 93 % | BODY MASS INDEX: 39.33 KG/M2 | RESPIRATION RATE: 28 BRPM | DIASTOLIC BLOOD PRESSURE: 60 MMHG

## 2023-05-11 LAB
ANION GAP SERPL CALC-SCNC: 12 MMOL/L (ref 7–16)
BUN SERPL-MCNC: 19 MG/DL (ref 8–22)
CALCIUM SERPL-MCNC: 9 MG/DL (ref 8.5–10.5)
CHLORIDE SERPL-SCNC: 93 MMOL/L (ref 96–112)
CO2 SERPL-SCNC: 26 MMOL/L (ref 20–33)
CREAT SERPL-MCNC: 0.87 MG/DL (ref 0.5–1.4)
EKG IMPRESSION: NORMAL
EKG IMPRESSION: NORMAL
ERYTHROCYTE [DISTWIDTH] IN BLOOD BY AUTOMATED COUNT: 46.6 FL (ref 35.9–50)
GFR SERPLBLD CREATININE-BSD FMLA CKD-EPI: 74 ML/MIN/1.73 M 2
GLUCOSE BLD STRIP.AUTO-MCNC: 130 MG/DL (ref 65–99)
GLUCOSE BLD STRIP.AUTO-MCNC: 133 MG/DL (ref 65–99)
GLUCOSE BLD STRIP.AUTO-MCNC: 148 MG/DL (ref 65–99)
GLUCOSE SERPL-MCNC: 133 MG/DL (ref 65–99)
HCT VFR BLD AUTO: 41.5 % (ref 37–47)
HGB BLD-MCNC: 14.2 G/DL (ref 12–16)
MAGNESIUM SERPL-MCNC: 1.6 MG/DL (ref 1.5–2.5)
MCH RBC QN AUTO: 31.7 PG (ref 27–33)
MCHC RBC AUTO-ENTMCNC: 34.2 G/DL (ref 33.6–35)
MCV RBC AUTO: 92.6 FL (ref 81.4–97.8)
PLATELET # BLD AUTO: 322 K/UL (ref 164–446)
PMV BLD AUTO: 10.3 FL (ref 9–12.9)
POTASSIUM SERPL-SCNC: 3.4 MMOL/L (ref 3.6–5.5)
RBC # BLD AUTO: 4.48 M/UL (ref 4.2–5.4)
SODIUM SERPL-SCNC: 131 MMOL/L (ref 135–145)
WBC # BLD AUTO: 13.5 K/UL (ref 4.8–10.8)

## 2023-05-11 PROCEDURE — 93005 ELECTROCARDIOGRAM TRACING: CPT | Performed by: STUDENT IN AN ORGANIZED HEALTH CARE EDUCATION/TRAINING PROGRAM

## 2023-05-11 PROCEDURE — 96365 THER/PROPH/DIAG IV INF INIT: CPT | Mod: XU

## 2023-05-11 PROCEDURE — 160002 HCHG RECOVERY MINUTES (STAT)

## 2023-05-11 PROCEDURE — 82962 GLUCOSE BLOOD TEST: CPT | Mod: 91

## 2023-05-11 PROCEDURE — 700102 HCHG RX REV CODE 250 W/ 637 OVERRIDE(OP): Mod: UD | Performed by: STUDENT IN AN ORGANIZED HEALTH CARE EDUCATION/TRAINING PROGRAM

## 2023-05-11 PROCEDURE — G0378 HOSPITAL OBSERVATION PER HR: HCPCS

## 2023-05-11 PROCEDURE — 700111 HCHG RX REV CODE 636 W/ 250 OVERRIDE (IP): Mod: UD | Performed by: STUDENT IN AN ORGANIZED HEALTH CARE EDUCATION/TRAINING PROGRAM

## 2023-05-11 PROCEDURE — 700102 HCHG RX REV CODE 250 W/ 637 OVERRIDE(OP): Mod: UD | Performed by: INTERNAL MEDICINE

## 2023-05-11 PROCEDURE — 99233 SBSQ HOSP IP/OBS HIGH 50: CPT | Mod: 25,FS | Performed by: INTERNAL MEDICINE

## 2023-05-11 PROCEDURE — 93010 ELECTROCARDIOGRAM REPORT: CPT | Mod: 59,76 | Performed by: INTERNAL MEDICINE

## 2023-05-11 PROCEDURE — 700111 HCHG RX REV CODE 636 W/ 250 OVERRIDE (IP): Mod: UD | Performed by: ANESTHESIOLOGY

## 2023-05-11 PROCEDURE — 85027 COMPLETE CBC AUTOMATED: CPT

## 2023-05-11 PROCEDURE — 92960 CARDIOVERSION ELECTRIC EXT: CPT | Performed by: INTERNAL MEDICINE

## 2023-05-11 PROCEDURE — 92960 CARDIOVERSION ELECTRIC EXT: CPT

## 2023-05-11 PROCEDURE — 160035 HCHG PACU - 1ST 60 MINS PHASE I

## 2023-05-11 PROCEDURE — 01922 ANES N-INVAS IMG/RADJ THER: CPT | Performed by: ANESTHESIOLOGY

## 2023-05-11 PROCEDURE — 83735 ASSAY OF MAGNESIUM: CPT

## 2023-05-11 PROCEDURE — 80048 BASIC METABOLIC PNL TOTAL CA: CPT

## 2023-05-11 PROCEDURE — 93005 ELECTROCARDIOGRAM TRACING: CPT | Performed by: INTERNAL MEDICINE

## 2023-05-11 PROCEDURE — 93325 DOPPLER ECHO COLOR FLOW MAPG: CPT

## 2023-05-11 PROCEDURE — 93312 ECHO TRANSESOPHAGEAL: CPT | Mod: 26 | Performed by: INTERNAL MEDICINE

## 2023-05-11 PROCEDURE — 700105 HCHG RX REV CODE 258: Mod: UD | Performed by: ANESTHESIOLOGY

## 2023-05-11 PROCEDURE — 96366 THER/PROPH/DIAG IV INF ADDON: CPT | Mod: XU

## 2023-05-11 PROCEDURE — A9270 NON-COVERED ITEM OR SERVICE: HCPCS | Mod: UD | Performed by: INTERNAL MEDICINE

## 2023-05-11 PROCEDURE — A9270 NON-COVERED ITEM OR SERVICE: HCPCS | Mod: UD | Performed by: STUDENT IN AN ORGANIZED HEALTH CARE EDUCATION/TRAINING PROGRAM

## 2023-05-11 PROCEDURE — 99239 HOSP IP/OBS DSCHRG MGMT >30: CPT | Performed by: STUDENT IN AN ORGANIZED HEALTH CARE EDUCATION/TRAINING PROGRAM

## 2023-05-11 PROCEDURE — 93010 ELECTROCARDIOGRAM REPORT: CPT | Mod: 59 | Performed by: INTERNAL MEDICINE

## 2023-05-11 PROCEDURE — 36415 COLL VENOUS BLD VENIPUNCTURE: CPT

## 2023-05-11 RX ORDER — MAGNESIUM SULFATE HEPTAHYDRATE 40 MG/ML
2 INJECTION, SOLUTION INTRAVENOUS ONCE
Status: COMPLETED | OUTPATIENT
Start: 2023-05-11 | End: 2023-05-11

## 2023-05-11 RX ORDER — HALOPERIDOL 5 MG/ML
1 INJECTION INTRAMUSCULAR
Status: DISCONTINUED | OUTPATIENT
Start: 2023-05-11 | End: 2023-05-11 | Stop reason: HOSPADM

## 2023-05-11 RX ORDER — HYDROMORPHONE HYDROCHLORIDE 1 MG/ML
0.1 INJECTION, SOLUTION INTRAMUSCULAR; INTRAVENOUS; SUBCUTANEOUS
Status: DISCONTINUED | OUTPATIENT
Start: 2023-05-11 | End: 2023-05-11 | Stop reason: HOSPADM

## 2023-05-11 RX ORDER — DILTIAZEM HYDROCHLORIDE 240 MG/1
240 CAPSULE, COATED, EXTENDED RELEASE ORAL DAILY
Qty: 30 CAPSULE | Refills: 3 | Status: SHIPPED | OUTPATIENT
Start: 2023-05-12

## 2023-05-11 RX ORDER — SODIUM CHLORIDE, SODIUM LACTATE, POTASSIUM CHLORIDE, CALCIUM CHLORIDE 600; 310; 30; 20 MG/100ML; MG/100ML; MG/100ML; MG/100ML
INJECTION, SOLUTION INTRAVENOUS CONTINUOUS
Status: DISCONTINUED | OUTPATIENT
Start: 2023-05-11 | End: 2023-05-11 | Stop reason: HOSPADM

## 2023-05-11 RX ORDER — POTASSIUM CHLORIDE 20 MEQ/1
40 TABLET, EXTENDED RELEASE ORAL ONCE
Status: COMPLETED | OUTPATIENT
Start: 2023-05-11 | End: 2023-05-11

## 2023-05-11 RX ORDER — HYDROMORPHONE HYDROCHLORIDE 1 MG/ML
0.2 INJECTION, SOLUTION INTRAMUSCULAR; INTRAVENOUS; SUBCUTANEOUS
Status: DISCONTINUED | OUTPATIENT
Start: 2023-05-11 | End: 2023-05-11 | Stop reason: HOSPADM

## 2023-05-11 RX ORDER — MEPERIDINE HYDROCHLORIDE 25 MG/ML
6.25 INJECTION INTRAMUSCULAR; INTRAVENOUS; SUBCUTANEOUS
Status: DISCONTINUED | OUTPATIENT
Start: 2023-05-11 | End: 2023-05-11 | Stop reason: HOSPADM

## 2023-05-11 RX ORDER — OXYCODONE HCL 5 MG/5 ML
5 SOLUTION, ORAL ORAL
Status: DISCONTINUED | OUTPATIENT
Start: 2023-05-11 | End: 2023-05-11 | Stop reason: HOSPADM

## 2023-05-11 RX ORDER — DILTIAZEM HYDROCHLORIDE 240 MG/1
240 CAPSULE, COATED, EXTENDED RELEASE ORAL
Status: DISCONTINUED | OUTPATIENT
Start: 2023-05-12 | End: 2023-05-11 | Stop reason: HOSPADM

## 2023-05-11 RX ORDER — FLECAINIDE ACETATE 50 MG/1
50 TABLET ORAL 2 TIMES DAILY
Qty: 60 TABLET | Refills: 3 | Status: SHIPPED | OUTPATIENT
Start: 2023-05-11

## 2023-05-11 RX ORDER — HYDROMORPHONE HYDROCHLORIDE 1 MG/ML
0.4 INJECTION, SOLUTION INTRAMUSCULAR; INTRAVENOUS; SUBCUTANEOUS
Status: DISCONTINUED | OUTPATIENT
Start: 2023-05-11 | End: 2023-05-11 | Stop reason: HOSPADM

## 2023-05-11 RX ORDER — DIPHENHYDRAMINE HYDROCHLORIDE 50 MG/ML
12.5 INJECTION INTRAMUSCULAR; INTRAVENOUS
Status: DISCONTINUED | OUTPATIENT
Start: 2023-05-11 | End: 2023-05-11 | Stop reason: HOSPADM

## 2023-05-11 RX ORDER — ONDANSETRON 2 MG/ML
4 INJECTION INTRAMUSCULAR; INTRAVENOUS
Status: DISCONTINUED | OUTPATIENT
Start: 2023-05-11 | End: 2023-05-11 | Stop reason: HOSPADM

## 2023-05-11 RX ORDER — OXYCODONE HCL 5 MG/5 ML
10 SOLUTION, ORAL ORAL
Status: DISCONTINUED | OUTPATIENT
Start: 2023-05-11 | End: 2023-05-11 | Stop reason: HOSPADM

## 2023-05-11 RX ORDER — SODIUM CHLORIDE, SODIUM LACTATE, POTASSIUM CHLORIDE, CALCIUM CHLORIDE 600; 310; 30; 20 MG/100ML; MG/100ML; MG/100ML; MG/100ML
INJECTION, SOLUTION INTRAVENOUS
Status: DISCONTINUED | OUTPATIENT
Start: 2023-05-11 | End: 2023-05-11 | Stop reason: SURG

## 2023-05-11 RX ADMIN — PROPOFOL 30 MG: 10 INJECTION, EMULSION INTRAVENOUS at 13:39

## 2023-05-11 RX ADMIN — PROPOFOL 70 MG: 10 INJECTION, EMULSION INTRAVENOUS at 13:38

## 2023-05-11 RX ADMIN — FLECAINIDE ACETATE 50 MG: 100 TABLET ORAL at 17:42

## 2023-05-11 RX ADMIN — LISINOPRIL 20 MG: 20 TABLET ORAL at 05:13

## 2023-05-11 RX ADMIN — FUROSEMIDE 40 MG: 40 TABLET ORAL at 05:14

## 2023-05-11 RX ADMIN — OMEPRAZOLE 20 MG: 20 CAPSULE, DELAYED RELEASE ORAL at 05:14

## 2023-05-11 RX ADMIN — APIXABAN 5 MG: 5 TABLET, FILM COATED ORAL at 14:55

## 2023-05-11 RX ADMIN — MAGNESIUM SULFATE HEPTAHYDRATE 2 G: 2 INJECTION, SOLUTION INTRAVENOUS at 14:58

## 2023-05-11 RX ADMIN — POTASSIUM CHLORIDE 40 MEQ: 1500 TABLET, EXTENDED RELEASE ORAL at 09:16

## 2023-05-11 RX ADMIN — FLECAINIDE ACETATE 50 MG: 100 TABLET ORAL at 05:13

## 2023-05-11 RX ADMIN — HYDROCHLOROTHIAZIDE 25 MG: 25 TABLET ORAL at 05:14

## 2023-05-11 RX ADMIN — SENNOSIDES AND DOCUSATE SODIUM 2 TABLET: 50; 8.6 TABLET ORAL at 05:14

## 2023-05-11 RX ADMIN — DILTIAZEM HYDROCHLORIDE 60 MG: 60 TABLET, FILM COATED ORAL at 05:13

## 2023-05-11 RX ADMIN — SODIUM CHLORIDE, POTASSIUM CHLORIDE, SODIUM LACTATE AND CALCIUM CHLORIDE: 600; 310; 30; 20 INJECTION, SOLUTION INTRAVENOUS at 13:28

## 2023-05-11 RX ADMIN — DILTIAZEM HYDROCHLORIDE 60 MG: 60 TABLET, FILM COATED ORAL at 13:07

## 2023-05-11 ASSESSMENT — COGNITIVE AND FUNCTIONAL STATUS - GENERAL
MOBILITY SCORE: 24
SUGGESTED CMS G CODE MODIFIER MOBILITY: CH
SUGGESTED CMS G CODE MODIFIER DAILY ACTIVITY: CH
DAILY ACTIVITIY SCORE: 24

## 2023-05-11 ASSESSMENT — PAIN DESCRIPTION - PAIN TYPE: TYPE: ACUTE PAIN

## 2023-05-11 NOTE — PROCEDURES
Electrical Cardioversion    Date/Time: 5/11/2023 1:49 PM    Performed by: Pedro Kee M.D.  Authorized by: Pedro Kee M.D.    Consent:     Consent obtained:  Verbal and written    Consent given by:  Patient    Risks discussed:  Cutaneous burn, death, induced arrhythmia and pain    Alternatives discussed:  No treatment, rate-control medication, anti-coagulation medication, alternative treatment, observation, delayed treatment and referral  Sedation:     Patient sedated: Yes      Sedation type:  Per anesthesia  Pre-procedure details:     Cardioversion basis:  Elective    Rhythm:  Atrial fibrillation    Electrode placement:  Anterior-posterior    Anticoagulation status:  Apixaban  Attempt one:     Cardioversion mode:  Synchronous    Waveform:  Biphasic    Shock (Joules):  200    Shock outcome:  Conversion to normal sinus rhythm  Post-procedure details:     Patient status:  Awake    Patient tolerance of procedure:  Tolerated well, no immediate complications

## 2023-05-11 NOTE — PROGRESS NOTES
0700 - Report received from Maliha RN at patient's bedside. Patient resting in bed quietly with no complaints at this time. Telemetry monitor intact et functioning. Call light and belongings within reach, safety measures intact, white board updated.     0900 - Discussed GARRY/cardioversion procedure including NPO, medications and what to expect post op. Patient verbalized understanding. Able to ambulate easily to bathroom, does become tachycardic when up, to 149.     1100 - Spoke with cardioversion staff. Patient will go for procedure around 1315.    1310 - Patient off floor to cardioversion.     1415 - Patient returned from PACU. Alert et awake. Able to ambulate to bed with some mild dizziness.     1430 - No difficulty swallowing. Numbness resolved.     1615 - Patient ambulated around unit with RN without difficulty, no dizziness noted.     1730 - Patient called RN to room, feeling odd but has now resolved. Discussed and patient thinks she got too tired and then got scared. Now she is able to walk and does not feel dizzy. Got dressed with no symptoms. Patient states she does feel comfortable going home. Discussed taking her time while getting up and checking BP at home is she feels unusual. All belongings packed and sent with . IV removed with immediate hemostasis. Telemetry removed and returned to desk. Patient transported to private vehicle via wheelchair for transport home.

## 2023-05-11 NOTE — ANESTHESIA PREPROCEDURE EVALUATION
" Date/Time: 05/11/23 1330    Scheduled providers: Pedro Kee M.D.; Geronimo Mcgregor M.D.    Procedures:       EC-GARRY W/O CONT      CL-CARDIOVERSION    Diagnosis:       Chest pain [R07.9]      Chest pain [R07.9]      Chest pain [R07.9]    Indications: See Assoicated Dx    Location: [facility] IMAGING - ECHOCARDIOLOGY [facility]          Relevant Problems   PULMONARY   (positive) Asthma      CARDIAC   (positive) Atrial fibrillation with RVR (HCC)   (positive) Hypertension   (positive) Paroxysmal atrial fibrillation (HCC)     BP (!) 144/69 Comment: Rn notified   Pulse 94   Temp 36.6 °C (97.9 °F) (Temporal)   Resp 16   Ht 1.676 m (5' 6\")   Wt 111 kg (244 lb 11.4 oz)   SpO2 93%   BMI 39.50 kg/m²     Physical Exam    Airway   Mallampati: II  TM distance: >3 FB  Neck ROM: full       Cardiovascular - normal exam  Rhythm: regular  Rate: normal  (-) murmur     Dental - normal exam           Pulmonary - normal exam  Breath sounds clear to auscultation     Abdominal    Neurological - normal exam                 Anesthesia Plan    ASA 2       Plan - general       Airway plan will be natural airway          Induction: intravenous      Pertinent diagnostic labs and testing reviewed    Informed Consent:    Anesthetic plan and risks discussed with patient.    Use of blood products discussed with: patient whom consented to blood products.         "

## 2023-05-11 NOTE — PROGRESS NOTES
[facility] Cardiology Follow-up Note    Date of Service:    5/11/2023      Name:   Jeanna Santana     YOB: 1960  Age:   62 y.o.  female   MRN:   [id number]      Reason for cardiology consult: Afib    Attending Provider: Dr Medina    HPI:  Jeanna Santana is a 62 year-old female with a past medical history of DM2, HTN, HLD, and asthma that presented 05/08/23 after found to be in Afib w/ RVR prior to cataract surgery, as well as dyspnea on exertion and L sided chest pain and sent to ED.     Interim Events:  - Successful cardioversion today  - Personal Telemetry interpretation: Afib this am, now SR  - Vitals: WNL   - Labs reviewed: K+ 3.4    ROS  Constitutional: + fatigue.  Respiratory: mild shortness of breath, no cough.  Cardiovascular: denies chest pain. denies lower extremity edema.  Denies orthopnea or PND.  : denies polyuria, no dysuria.  GI:  Denies nausea/vomiting.  No abdominal distention.  Neuro:  Denies dizziness, syncope.  Hem/lymph: Denies easy bleeding/bruising.      All other review of systems reviewed and negative.    Past medical, surgical, social, and family history reviewed and unchanged from admission except as noted in HPI.    Medications: Reviewed in MAR  Current Facility-Administered Medications   Medication Dose Frequency Provider Last Rate Last Admin    magnesium sulfate IVPB premix 2 g  2 g Once Alexandra Medina M.D.        apixaban (ELIQUIS) tablet 5 mg  5 mg BID Pedro Kee M.D.        dilTIAZem (Cardizem) tablet 60 mg  60 mg Q6HRS Cj Espinoza M.D.   60 mg at 05/11/23 1307    flecainide (TAMBOCOR) tablet 50 mg  50 mg TWICE DAILY Pedro Kee M.D.   50 mg at 05/11/23 0513    lisinopril (PRINIVIL) tablet 20 mg  20 mg Q DAY Sid Garcia M.D.   20 mg at 05/11/23 0513    And    hydroCHLOROthiazide (HYDRODIURIL) tablet 25 mg  25 mg Q DAY Sid Garcia M.D.   25 mg at 05/11/23 0514    aminophylline injection 100 mg  100 mg Once PRN Sid Garcia M.D.         senna-docusate (PERICOLACE or SENOKOT S) 8.6-50 MG per tablet 2 Tablet  2 Tablet BID Sid Garcia M.D.   2 Tablet at 05/11/23 0514    And    polyethylene glycol/lytes (MIRALAX) PACKET 1 Packet  1 Packet QDAY PRKENIA Garcia M.D.        And    magnesium hydroxide (MILK OF MAGNESIA) suspension 30 mL  30 mL QDAY PRN Sid Garcia M.D.        And    bisacodyl (DULCOLAX) suppository 10 mg  10 mg QDAY PRN Sid Garcia M.D.        acetaminophen (Tylenol) tablet 650 mg  650 mg Q6HRS PRKENIA Garcia M.D.   650 mg at 05/10/23 2017    labetalol (NORMODYNE/TRANDATE) injection 10 mg  10 mg Q4HRS PRKENIA Garcia M.D.        ondansetron (ZOFRAN) syringe/vial injection 4 mg  4 mg Q4HRS PRKENIA Garcia M.D.        ondansetron (ZOFRAN ODT) dispertab 4 mg  4 mg Q4HRS PRKENIA Garcia M.D.        promethazine (PHENERGAN) tablet 12.5-25 mg  12.5-25 mg Q4HRS PRKENIA Garcia M.D.        promethazine (PHENERGAN) suppository 12.5-25 mg  12.5-25 mg Q4HRS PRKENIA Garcia M.D.        prochlorperazine (COMPAZINE) injection 5-10 mg  5-10 mg Q4HRS PRKENIA Garcia M.D.        dilTIAZem (Cardizem) injection 10 mg  10 mg Q4HRS PRN Sid Garcia M.D.        atorvastatin (LIPITOR) tablet 20 mg  20 mg QHS Sid Garcia M.D.   20 mg at 05/10/23 2017    furosemide (LASIX) tablet 40 mg  40 mg DAILY Sid Garcia M.D.   40 mg at 05/11/23 0514    montelukast (SINGULAIR) tablet 10 mg  10 mg QHS Sid Garcia M.D.   10 mg at 05/10/23 2017    omeprazole (PRILOSEC) capsule 20 mg  20 mg DAILY Sid Garcia M.D.   20 mg at 05/11/23 0514    traZODone (DESYREL) tablet 50 mg  50 mg QHS Sid Garcia M.D.   50 mg at 05/10/23 2017    ipratropium (ATROVENT) 0.02 % nebulizer solution 0.5 mg  0.5 mg Q4HRS PRN Sid Garcia M.D.       Last reviewed on 5/8/2023  9:23 PM by Carey Hardy PhT   Allergies   Allergen Reactions    Daptomycin Unspecified     Unknown childhood reaction    Bactrim Ds        Physical Exam  Body mass index is 39.5 kg/m². BP  "129/68   Pulse 67   Temp 36.7 °C (98.1 °F) (Temporal)   Resp 18   Ht 1.676 m (5' 6\")   Wt 111 kg (244 lb 11.4 oz)   SpO2 92%    Vitals:    05/11/23 1350 05/11/23 1400 05/11/23 1415 05/11/23 1430   BP: 106/64 114/63 129/68    Pulse: 68 69 67    Resp: 18 17 18    Temp: 36.7 °C (98 °F)   36.7 °C (98.1 °F)   TempSrc: Temporal   Temporal   SpO2: 95% 92% 92%    Weight:       Height:        Oxygen Therapy:  Pulse Oximetry: 92 %, O2 (LPM): 6, O2 Delivery Device: None - Room Air    General: no acute distress, well appearing, BMI 39  Neck:  JVD, no bruits  Lungs: CTAB, normal effort. no wheezing, rales, or rhonchi  Heart: RRR, normal S1 /S2, no murmur, no rub  EXT: No lower extremity edema, 2+ radial pulses. 2+ pedal pulses.   Abdomen: soft, non tender, non distended  Neurological: No focal deficits, no facial asymmetry.  Normal speech  Psychiatric: Appropriate affect, alert and oriented x 3  Skin: Warm and dry extremities, no rashes    Labs (personally reviewed):     Lab Results   Component Value Date/Time    SODIUM 131 (L) 05/11/2023 01:31 AM    POTASSIUM 3.4 (L) 05/11/2023 01:31 AM    CHLORIDE 93 (L) 05/11/2023 01:31 AM    CO2 26 05/11/2023 01:31 AM    GLUCOSE 133 (H) 05/11/2023 01:31 AM    BUN 19 05/11/2023 01:31 AM    CREATININE 0.87 05/11/2023 01:31 AM     Lab Results   Component Value Date/Time    ALKPHOSPHAT 77 05/08/2023 04:47 PM    ASTSGOT 18 05/08/2023 04:47 PM    ALTSGPT 14 05/08/2023 04:47 PM    TBILIRUBIN 0.4 05/08/2023 04:47 PM      Lab Results   Component Value Date/Time    CHOLSTRLTOT 139 05/09/2023 12:22 AM    LDL 76 05/09/2023 12:22 AM    HDL 51 05/09/2023 12:22 AM    TRIGLYCERIDE 59 05/09/2023 12:22 AM       Cardiac Imaging and Procedures Review:      EKG 5/11/23: My Personal interpretation reveals SR 73    Echo 5/9/23:  Normal left ventricular systolic function.  The left ventricular ejection fraction is visually estimated to be 60-  65%.  Diastolic function is difficult to assess with " arrhythmia.  The right ventricle is normal in size and systolic function.  Estimated right ventricular systolic pressure is 28 mmHg.  Moderate biatrial dilation.  No significant valvular abnormalities.  Normal inferior vena cava size and inspiratory collapse.    Assessment and Medical Decision Making:    Afib  -Successful cardioversion, patient maintaining normal sinus rhythm, feeling much better  -Continue flecainide 50 mg twice daily  -Continue CCB, diltiazem, converted to 240 mg XL daily    Thank you for allowing me to participate in this patients care.  Please contact me with any questions or concerns.  Okay to discharge today from cardiology standpoint.    Please see Dr. Kee's attestation for additions and further recommendations.    Future Appointments   Date Time Provider Department Center   5/30/2023  2:00 PM FLAVIA Valdez. [facility] None       I personally spent a total of 15 minutes which includes face-to-face time and non-face-to-face time spent on preparing to see the patient, reviewing hospital notes and tests, obtaining history from the patient, performing a medically appropriate exam, counseling and educating the patient, ordering medications/tests/procedures/referrals as clinically indicated, and documenting information in the electronic medical record.    PLEASE NOTE: Some of this dictation was created using voice recognition software. I have made every reasonable attempt to correct obvious errors, but I expect that there are errors of grammar and possibly content that I did not discover before finalizing the note.     FLAVIA Salcido.   [facility]  [phone]

## 2023-05-11 NOTE — PROGRESS NOTES
Monitor Summary:  Rhythm: Afib  Rate: 70-84, up to 160's with ambulation  Ectopy: None  Measurement:  -/.10/-

## 2023-05-11 NOTE — CARE PLAN
The patient is Stable - Low risk of patient condition declining or worsening    Shift Goals  Clinical Goals: Pt will not sustain a HR above 150 this shift  Patient Goals: comfort, pain control of R foot  Family Goals: DARREN    Progress made toward(s) clinical / shift goals:  GARRY, successful cardioversion.       Problem: Knowledge Deficit - Standard  Goal: Patient and family/care givers will demonstrate understanding of plan of care, disease process/condition, diagnostic tests and medications  Outcome: Progressing     Problem: Discharge Barriers/Planning  Goal: Patient's continuum of care needs are met  Outcome: Progressing  Flowsheets (Taken 5/11/2023 1542)  Continuum of Care Needs:   Assessed for discharge barriers   Involved patient/family/support system in discharge process     Problem: Hemodynamics  Goal: Patient's hemodynamics, fluid balance and neurologic status will be stable or improve  Outcome: Progressing     Problem: Infection - Standard  Goal: Patient will remain free from infection  Outcome: Progressing  Flowsheets (Taken 5/11/2023 1542)  Standard Infection Interventions:   Assessed for signs and symptoms of infection   Implemented standard precautions   Instructed patient/family on signs and symptoms of infection   Provided education on proper hand hygiene and infection prevention measures     Problem: Pain - Standard  Goal: Alleviation of pain or a reduction in pain to the patient’s comfort goal  Outcome: Progressing  Flowsheets (Taken 5/11/2023 1542)  Pain Rating Scale (NPRS): 0       Patient is not progressing towards the following goals:

## 2023-05-11 NOTE — DISCHARGE PLANNING
MICHAEL Jimenez met with pt bedside and introduced Community Care Management and attempted to complete SDOH. Multiple family members present. Pt lives in [facility] and states that she has no needs to all of the above.   Pt spouse will provide transportation home at the time of discharge.   Community Health Worker Intake  Social determinates of health intake Not completed.   Identified barriers to None.  Contact information provided to Jeanna Santana No  Has PCP appointment scheduled for Pt will call   Scheduled Food Delivery/Home Visit/Outpatient Visit: No  Inpatient assessment completed.No      Plan:     Will discharge pt from CCM and remove from case load as pt has no needs.

## 2023-05-11 NOTE — ANESTHESIA TIME REPORT
Anesthesia Start and Stop Event Times     Date Time Event    5/11/2023 1312 Ready for Procedure     1328 Anesthesia Start     1356 Anesthesia Stop        Responsible Staff  05/11/23    Name Role Begin End    Geronimo Mcgregor M.D. Anesth 1328 1356        Overtime Reason:  no overtime (within assigned shift)    Comments:

## 2023-05-11 NOTE — PROGRESS NOTES
Monitor Summary    Rhythm: A-fib 86-91    Ectopy: touched down to 36 bpm, 2.5, 2.7, and 2.8 sec pauses    Intervals: -/08/32

## 2023-05-11 NOTE — PROGRESS NOTES
Bedside report received from PATRICIA Nunes. Pt on RA. Patient A&O x 4. Complains of pain 3/10 in R foot. POC discussed with patient. Patient verbalizes understanding. Call light and belongings within reach. Bed locked in lowest position, alarm and fall precautions in place.

## 2023-05-11 NOTE — ANESTHESIA POSTPROCEDURE EVALUATION
Patient: Jeanna Santana    Procedure Summary     Date: 05/11/23 Room / Location: [facility] ECHOCARDIOLOGY [facility]    Anesthesia Start: 1328 Anesthesia Stop: 1356    Procedures:       EC-GARRY W/O CONT      CL-CARDIOVERSION Diagnosis:       Chest pain      Chest pain      Chest pain      (See Assoicated Dx)    Scheduled Providers: Pedro Kee M.D.; Geronimo Mcgregor M.D. Responsible Provider: Geronimo Mcgregor M.D.    Anesthesia Type: general ASA Status: 2          Final Anesthesia Type: general  Last vitals  BP   Blood Pressure : (!) 144/69 (Rn notified )    Temp   36.6 °C (97.9 °F)    Pulse   94   Resp   16    SpO2   93 %      Anesthesia Post Evaluation    Patient location during evaluation: PACU  Patient participation: complete - patient participated  Level of consciousness: awake and alert    Airway patency: patent  Anesthetic complications: no  Cardiovascular status: hemodynamically stable  Respiratory status: face mask    PONV: none          No notable events documented.     Nurse Pain Score: 3 (NPRS)

## 2023-05-11 NOTE — DISCHARGE PLANNING
Care Transition Team Assessment    LMSW spoke with pt at bedside to conduct assessment, pt oriented x4. Pt states her PCP is at the [facility], Yamel Dee. PT lives with spouse in home and grandson who is 20 years old. Pt claims she has good family support and does not require any DME. Pt reports being okay on financials and no past with MH/SA. No needs at this time, LMSW to follow up.     Information Source  Orientation Level: Oriented X4  Information Given By: Patient  Who is responsible for making decisions for patient? : Patient    Readmission Evaluation  Is this a readmission?: No    Elopement Risk  Legal Hold: No  Ambulatory or Self Mobile in Wheelchair: Yes  Disoriented: No  Psychiatric Symptoms: None  History of Wandering: No  Elopement this Admit: No  Vocalizing Wanting to Leave: No  Displays Behaviors, Body Language Wanting to Leave: No-Not at Risk for Elopement  Elopement Risk: Not at Risk for Elopement    Interdisciplinary Discharge Planning  Primary Care Physician: Yamel Dee at [facility]  Lives with - Patient's Self Care Capacity: Spouse  Patient or legal guardian wants to designate a caregiver: No  Support Systems: None  Housing / Facility: Motor Home  Able to Return to Previous ADL's: Yes  Mobility Issues: No  Prior Services: None  Assistance Needed: No  Durable Medical Equipment: Not Applicable    Discharge Preparedness  What is your plan after discharge?: Home with help  What are your discharge supports?: Spouse, Other (comment) (Grandson)  Prior Functional Level: Ambulatory, Drives Self, Independent with Activities of Daily Living    Functional Assesment  Prior Functional Level: Ambulatory, Drives Self, Independent with Activities of Daily Living    Finances  Financial Barriers to Discharge: No  Prescription Coverage: Yes    Vision / Hearing Impairment  Vision Impairment : Yes  Right Eye Vision: Impaired, Wears Glasses  Left Eye Vision: Impaired, Wears Glasses  Hearing Impairment  : No    Domestic Abuse  Have you ever been the victim of abuse or violence?: No  Physical Abuse or Sexual Abuse: No  Verbal Abuse or Emotional Abuse: No  Possible Abuse/Neglect Reported to:: Not Applicable    Psychological Assessment  History of Substance Abuse: None  History of Psychiatric Problems: No  Non-compliant with Treatment: No  Newly Diagnosed Illness: No    Discharge Risks or Barriers  Patient risk factors: Lack of outside supports    Anticipated Discharge Information  Discharge Disposition: Discharged to home/self care (01)

## 2023-05-12 NOTE — DISCHARGE SUMMARY
Discharge Summary    CHIEF COMPLAINT ON ADMISSION  Chief Complaint   Patient presents with    Hypertension    Sent by MD     Pt at [facility] appointment and found to have hypertension/abnormal ekg and sent to [facility] for further eval. Pt reports taking high blood pressure medication.    Shortness of Breath     EKG shows a-fib.       Reason for Admission  sent by MD     Admission Date  5/8/2023    CODE STATUS  Full Code    HPI & HOSPITAL COURSE    Ms. Jeanna Santana is a 62 y.o. female with history of coronary artery disease, hypertension, hyperlipidemia and diabetes who presented 5/8/2023 with evaluation for arrhythmia.      Patient was seen by ophthalmology office for elective cataract surgery.  While at the office, noted to have tachycardia, EKG was done, due to concerning arrhythmia, patient was subsequently referred to ER for evaluation.      In ER, patient was found to be in a state of atrial fibrillation RVR with rate as high as 140s.  It appears that she was given Cardizem oral 60 mg time, and heart rate now in 80-100s.  Patient also complained of chest pain, palpitation, shortness of breath at this time.  Medicine service was asked to admit patient for ACS rule out.    Patient was monitored overnight with no events noted.  Echocardiogram pursued noting LVEF approximately 60-65% with diastolic function difficult to assess due to arrhythmia, right ventricle normal size and systolic function and estimated right ventricular systolic pressure at 28 mmHg.  Moderate biatrial dilation with no significant valvular abnormalities.  Stress test also ordered and showed no prior MI or areas of ischemia.  Cardiology was consulted due to new onset atrial fibrillation with RVR, GARRY cardioversion was recommended.  Patient was started on anticoagulation.  Patient underwent successful GARRY cardioversion on 5/11/2023.  Patient will continue anticoagulation rate control with diltiazem and rhythm control with flecainide.  Her  electrolytes were optimized.  She did well post cardioversion and was hemodynamically stable for discharge.  She was educated signs symptoms that prompt her to seek medical attention.  She was comfortable and eager for discharge      Therefore, she is discharged in fair and stable condition to home with close outpatient follow-up.    The patient met 2-midnight criteria for an inpatient stay at the time of discharge.    Discharge Date  5/11/2023    FOLLOW UP ITEMS POST DISCHARGE  Cardiology  Rate control, rhythm control  Chronic medical conditions    DISCHARGE DIAGNOSES  Principal Problem:    Chest pain (POA: Yes)  Active Problems:    Paroxysmal atrial fibrillation (HCC) (POA: Unknown)    Atrial fibrillation with RVR (HCC) (POA: Unknown)    Class 3 severe obesity in adult (HCC) (POA: Unknown)    Hypertension (POA: Unknown)    Hyperlipidemia (POA: Unknown)    Diabetes (HCC) (POA: Unknown)    Asthma (POA: Unknown)  Resolved Problems:    * No resolved hospital problems. *      FOLLOW UP  Future Appointments   Date Time Provider Department Center   5/30/2023  2:00 PM BHARTI Valdez [facility] None     Pedro Kee M.D.  [address] [place] [id number]  [phone]    Schedule an appointment as soon as possible for a visit in 2 week(s)  call to schedule outpatient follow up visit      MEDICATIONS ON DISCHARGE     Medication List        START taking these medications        Instructions   apixaban 5mg Tabs  Start taking on: May 12, 2023  Commonly known as: ELIQUIS   Take 1 Tablet by mouth 2 times a day. Indications: Thromboembolism secondary to Atrial Fibrillation  Dose: 5 mg     dilTIAZem  MG Cp24  Start taking on: May 12, 2023  Commonly known as: Cardizem CD   Take 1 Capsule by mouth every day.  Dose: 240 mg     flecainide 50 MG tablet  Commonly known as: TAMBOCOR   Take 1 Tablet by mouth 2 times a day.  Dose: 50 mg            CONTINUE taking these medications        Instructions    atorvastatin 20 MG Tabs  Commonly known as: LIPITOR   Take 20 mg by mouth at bedtime.  Dose: 20 mg     docusate sodium 100 MG Caps  Commonly known as: COLACE   Take 100 mg by mouth 2 times a day.  Dose: 100 mg     furosemide 40 MG Tabs  Commonly known as: LASIX   Take 40 mg by mouth every day.  Dose: 40 mg     GARLIQUE PO   Take 1 Capsule by mouth every day.  Dose: 1 Capsule     GLUCOSAMINE-CHONDROITIN PO   Take 1 Capsule by mouth 2 times a day.  Dose: 1 Capsule     lisinopril-hydrochlorothiazide 20-25 MG per tablet  Commonly known as: PRINZIDE   Take 2 Tablets by mouth every morning.  Dose: 2 Tablet     metFORMIN 500 MG Tabs  Commonly known as: GLUCOPHAGE   Take 500 mg by mouth 2 times a day with meals.  Dose: 500 mg     montelukast 10 MG Tabs  Commonly known as: SINGULAIR   Take 10 mg by mouth at bedtime.  Dose: 10 mg     OCUVITE PO   Take 1 Tablet by mouth every day. Generic OTC Version of Ocuvite  Dose: 1 Tablet     omeprazole 20 MG delayed-release capsule  Commonly known as: PRILOSEC   Take 20 mg by mouth every day.  Dose: 20 mg     traZODone 50 MG Tabs  Commonly known as: DESYREL   Take 50 mg by mouth at bedtime.  Dose: 50 mg     VITAMIN C PO   Take 1 Tablet by mouth every day.  Dose: 1 Tablet              Allergies  Allergies   Allergen Reactions    Daptomycin Unspecified     Unknown childhood reaction    Bactrim Ds        DIET  Orders Placed This Encounter   Procedures    Diet Order Diet: Consistent CHO (Diabetic)     Standing Status:   Standing     Number of Occurrences:   1     Order Specific Question:   Diet:     Answer:   Consistent CHO (Diabetic) [4]       ACTIVITY  As tolerated.      CONSULTATIONS  Cardiology    PROCEDURES  GARRY with cardioversion on 5/11/2023    LABORATORY  Lab Results   Component Value Date    SODIUM 131 (L) 05/11/2023    POTASSIUM 3.4 (L) 05/11/2023    CHLORIDE 93 (L) 05/11/2023    CO2 26 05/11/2023    GLUCOSE 133 (H) 05/11/2023    BUN 19 05/11/2023    CREATININE 0.87 05/11/2023         Lab Results   Component Value Date    WBC 13.5 (H) 05/11/2023    HEMOGLOBIN 14.2 05/11/2023    HEMATOCRIT 41.5 05/11/2023    PLATELETCT 322 05/11/2023        Total time of the discharge process exceeds 45 minutes.

## 2023-05-12 NOTE — DISCHARGE INSTRUCTIONS
Diet    Diabetic Diet     A Diabetic Diet can help you control your blood glucose, lower your risk of heart disease, improve your blood pressure and maintain a healthy weight.  Eating healthy foods, low in calories, fat and carbohydrates at the same time every day can help control your blood glucose. Carbohydrates can greatly affect your blood glucose levels.  Counting carbs is important to keep your blood glucose at a healthy level, especially if you use insulin or take certain oral diabetes medicines.  Avoid alcohol as it can cause a sudden decrease in blood glucose (hypoglycemia), especially if you use insulin or take certain oral diabetes medicines.      Resume Your Normal Activity    You may resume your normal activity as tolerated.  Rest as needed.          Take medications as directed  Schedule follow up with your primary care doctor in the next 1 to 2 weeks for hospital follow-up  Follow-up with cardiology as scheduled or information is provided below  If you develop any chest pain, shortness of breath palpitations or other concerning signs or symptoms seek medical attention

## 2023-05-12 NOTE — CARE PLAN
Problem: Knowledge Deficit - Standard  Goal: Patient and family/care givers will demonstrate understanding of plan of care, disease process/condition, diagnostic tests and medications  5/11/2023 1820 by Mariposa Duke R.N.  Outcome: Met  5/11/2023 1542 by Mariposa Duke R.N.  Outcome: Progressing     Problem: Psychosocial  Goal: Patient's level of anxiety will decrease  Outcome: Met     Problem: Discharge Barriers/Planning  Goal: Patient's continuum of care needs are met  5/11/2023 1820 by Mariposa Duke R.N.  Outcome: Met  5/11/2023 1542 by Mariposa Duke R.N.  Outcome: Progressing  Flowsheets (Taken 5/11/2023 1542)  Continuum of Care Needs:   Assessed for discharge barriers   Involved patient/family/support system in discharge process     Problem: Hemodynamics  Goal: Patient's hemodynamics, fluid balance and neurologic status will be stable or improve  5/11/2023 1820 by Mariposa Duke R.N.  Outcome: Met  5/11/2023 1542 by Mariposa Duke R.N.  Outcome: Progressing     Problem: Infection - Standard  Goal: Patient will remain free from infection  5/11/2023 1820 by Mariposa Duke R.N.  Outcome: Met  5/11/2023 1542 by Mariposa Duke R.N.  Outcome: Progressing  Flowsheets (Taken 5/11/2023 1542)  Standard Infection Interventions:   Assessed for signs and symptoms of infection   Implemented standard precautions   Instructed patient/family on signs and symptoms of infection   Provided education on proper hand hygiene and infection prevention measures     Problem: Pain - Standard  Goal: Alleviation of pain or a reduction in pain to the patient’s comfort goal  5/11/2023 1820 by Mariposa Duke R.N.  Outcome: Met  5/11/2023 1542 by Mariposa Duke R.N.  Outcome: Progressing  Flowsheets (Taken 5/11/2023 1542)  Pain Rating Scale (NPRS): 0

## 2023-05-12 NOTE — PROGRESS NOTES
Monitor Summary:   Rhythm: Atrial fibrillation - cardioversion - sinus rhythm  Rate: 62-85, highest 171  Measurement: .18/.10/.38  Ectopy: Occasional PVC    12 hour chart check

## 2023-05-17 ENCOUNTER — TELEPHONE (OUTPATIENT)
Dept: HEALTH INFORMATION MANAGEMENT | Facility: OTHER | Age: 63
End: 2023-05-17
Payer: MEDICARE

## 2023-05-30 ENCOUNTER — OFFICE VISIT (OUTPATIENT)
Dept: CARDIOLOGY | Facility: MEDICAL CENTER | Age: 63
End: 2023-05-30
Attending: NURSE PRACTITIONER
Payer: MEDICARE

## 2023-05-30 VITALS
RESPIRATION RATE: 20 BRPM | HEART RATE: 83 BPM | DIASTOLIC BLOOD PRESSURE: 80 MMHG | OXYGEN SATURATION: 92 % | SYSTOLIC BLOOD PRESSURE: 156 MMHG | WEIGHT: 250 LBS | HEIGHT: 66 IN | BODY MASS INDEX: 40.18 KG/M2

## 2023-05-30 DIAGNOSIS — E11.65 TYPE 2 DIABETES MELLITUS WITH HYPERGLYCEMIA, WITHOUT LONG-TERM CURRENT USE OF INSULIN (HCC): ICD-10-CM

## 2023-05-30 DIAGNOSIS — I48.0 PAROXYSMAL ATRIAL FIBRILLATION (HCC): ICD-10-CM

## 2023-05-30 DIAGNOSIS — I10 PRIMARY HYPERTENSION: ICD-10-CM

## 2023-05-30 DIAGNOSIS — I48.91 ATRIAL FIBRILLATION WITH RVR (HCC): ICD-10-CM

## 2023-05-30 DIAGNOSIS — E78.5 HYPERLIPIDEMIA, UNSPECIFIED HYPERLIPIDEMIA TYPE: ICD-10-CM

## 2023-05-30 DIAGNOSIS — E87.6 HYPOKALEMIA: ICD-10-CM

## 2023-05-30 DIAGNOSIS — E66.01 CLASS 3 SEVERE OBESITY DUE TO EXCESS CALORIES WITH SERIOUS COMORBIDITY AND BODY MASS INDEX (BMI) OF 40.0 TO 44.9 IN ADULT (HCC): ICD-10-CM

## 2023-05-30 PROBLEM — R07.9 CHEST PAIN: Status: RESOLVED | Noted: 2023-05-08 | Resolved: 2023-05-30

## 2023-05-30 PROCEDURE — 99214 OFFICE O/P EST MOD 30 MIN: CPT | Performed by: NURSE PRACTITIONER

## 2023-05-30 PROCEDURE — 3077F SYST BP >= 140 MM HG: CPT | Performed by: NURSE PRACTITIONER

## 2023-05-30 PROCEDURE — 93005 ELECTROCARDIOGRAM TRACING: CPT | Performed by: NURSE PRACTITIONER

## 2023-05-30 PROCEDURE — 99212 OFFICE O/P EST SF 10 MIN: CPT | Performed by: NURSE PRACTITIONER

## 2023-05-30 PROCEDURE — 3079F DIAST BP 80-89 MM HG: CPT | Performed by: NURSE PRACTITIONER

## 2023-05-30 PROCEDURE — 99213 OFFICE O/P EST LOW 20 MIN: CPT | Performed by: NURSE PRACTITIONER

## 2023-05-30 ASSESSMENT — ENCOUNTER SYMPTOMS
CLAUDICATION: 0
ORTHOPNEA: 0
PND: 0
SHORTNESS OF BREATH: 0
MYALGIAS: 0
PALPITATIONS: 0
FEVER: 0
DIZZINESS: 0
ABDOMINAL PAIN: 0
COUGH: 0

## 2023-05-30 ASSESSMENT — FIBROSIS 4 INDEX: FIB4 SCORE: 0.97

## 2023-05-30 NOTE — PROGRESS NOTES
Chief Complaint   Patient presents with    Atrial Fibrillation     Subjective     Jeanna Santana is a 63 y.o. female who presents today for hospital follow up for new onset afib with RVR.    She saw Dr. Kee in the hospital.    Hx of obesity with probable TIM, (undiagnosed), HTN, HLD, DM type II, and now new onset afib.    She presents today with her  from [place], [place].    EKG shows SR post DCCV.    She has been feeling great since her hospital stay. She has enough refills on her medications.    She has chronic LE edema that is unchanged since the hospital.    She has no chest pain, shortness of breath, dizziness/lightheadedness, or palpitations.     Past Medical History:   Diagnosis Date    Diabetes (HCC)     Hyperlipidemia     Hypertension      History reviewed. No pertinent surgical history.  History reviewed. No pertinent family history.  Social History     Socioeconomic History    Marital status:      Spouse name: Not on file    Number of children: Not on file    Years of education: Not on file    Highest education level: Not on file   Occupational History    Not on file   Tobacco Use    Smoking status: Never    Smokeless tobacco: Never   Substance and Sexual Activity    Alcohol use: Never    Drug use: Never    Sexual activity: Not on file   Other Topics Concern    Not on file   Social History Narrative    Not on file     Social Determinants of Health     Financial Resource Strain: Not on file   Food Insecurity: Not on file   Transportation Needs: Not on file   Physical Activity: Not on file   Stress: Not on file   Social Connections: Not on file   Intimate Partner Violence: Not on file   Housing Stability: Not on file     Allergies   Allergen Reactions    Daptomycin Unspecified     Unknown childhood reaction    Bactrim Ds      Outpatient Encounter Medications as of 5/30/2023   Medication Sig Dispense Refill    multivitamin Tab Take 1 Tablet by mouth every day.      apixaban  "(ELIQUIS) 5mg Tab Take 1 Tablet by mouth 2 times a day. Indications: Thromboembolism secondary to Atrial Fibrillation 60 Tablet 3    dilTIAZem CD (CARDIZEM CD) 240 MG CAPSULE SR 24 HR Take 1 Capsule by mouth every day. 30 Capsule 3    flecainide (TAMBOCOR) 50 MG tablet Take 1 Tablet by mouth 2 times a day. 60 Tablet 3    atorvastatin (LIPITOR) 20 MG Tab Take 20 mg by mouth at bedtime.      furosemide (LASIX) 40 MG Tab Take 40 mg by mouth every day.      metFORMIN (GLUCOPHAGE) 500 MG Tab Take 500 mg by mouth 2 times a day with meals.      lisinopril-hydrochlorothiazide (PRINZIDE) 20-25 MG per tablet Take 2 Tablets by mouth every morning.      montelukast (SINGULAIR) 10 MG Tab Take 10 mg by mouth at bedtime.      traZODone (DESYREL) 50 MG Tab Take 50 mg by mouth at bedtime.      omeprazole (PRILOSEC) 20 MG delayed-release capsule Take 20 mg by mouth every day.      GLUCOSAMINE-CHONDROITIN PO Take 1 Capsule by mouth 2 times a day.      Garlic (GARLIQUE PO) Take 1 Capsule by mouth every day.      Ascorbic Acid (VITAMIN C PO) Take 1 Tablet by mouth every day.      Multiple Vitamins-Minerals (OCUVITE PO) Take 1 Tablet by mouth every day. Generic OTC Version of Ocuvite      docusate sodium (COLACE) 100 MG Cap Take 100 mg by mouth 2 times a day.       No facility-administered encounter medications on file as of 5/30/2023.     Review of Systems   Constitutional:  Negative for fever and malaise/fatigue.   Respiratory:  Negative for cough and shortness of breath.    Cardiovascular:  Positive for leg swelling. Negative for chest pain, palpitations, orthopnea, claudication and PND.   Gastrointestinal:  Negative for abdominal pain.   Musculoskeletal:  Negative for myalgias.   Neurological:  Negative for dizziness.              Objective     BP (!) 156/80 (BP Location: Left arm, Patient Position: Sitting, BP Cuff Size: Adult)   Pulse 83   Resp 20   Ht 1.676 m (5' 6\")   Wt 113 kg (250 lb)   SpO2 92%   BMI 40.35 kg/m² "     Physical Exam  Vitals and nursing note reviewed.   Constitutional:       Appearance: Normal appearance. She is well-developed. She is obese.   HENT:      Head: Normocephalic and atraumatic.   Neck:      Vascular: No JVD.   Cardiovascular:      Rate and Rhythm: Normal rate and regular rhythm.      Pulses: Normal pulses.      Heart sounds: Normal heart sounds.   Pulmonary:      Effort: Pulmonary effort is normal.      Breath sounds: Normal breath sounds.   Musculoskeletal:         General: Normal range of motion.      Right lower leg: Edema present.      Left lower leg: Edema present.   Skin:     General: Skin is warm and dry.      Capillary Refill: Capillary refill takes less than 2 seconds.   Neurological:      General: No focal deficit present.      Mental Status: She is alert and oriented to person, place, and time. Mental status is at baseline.   Psychiatric:         Mood and Affect: Mood normal.         Behavior: Behavior normal.         Thought Content: Thought content normal.         Judgment: Judgment normal.                Assessment & Plan     1. Atrial fibrillation with RVR ([facility])  EKG    Referral to Pulmonary and Sleep Medicine    EKG      2. Class 3 severe obesity due to excess calories with serious comorbidity and body mass index (BMI) of 40.0 to 44.9 in adult ([facility])  Referral to Pulmonary and Sleep Medicine      3. Type 2 diabetes mellitus with hyperglycemia, without long-term current use of insulin ([facility])        4. Hyperlipidemia, unspecified hyperlipidemia type        5. Primary hypertension        6. Paroxysmal atrial fibrillation ([facility])          Medical Decision Making: Today's Assessment/Status/Plan:      1. New onset afib with DCCV  -EKG shows SR post DCCV  -asymptomatic and rate controlled   -cont eliquis 5 mg BID for OAC  -cont diltiazem 240 mg QPM and flecainide 50 mg BID for rate control  -follow symptoms  -consider EP Referral if symptoms progress or afib returns    2. Obesity with probable  TIM and DM type II  -referral to sleep medicine  -work on weight loss with dietary modifications and start walking program  -diabetes management with PCP    3. HTN  -good control with diltiazem, furosemide, and lisinopril-HCTZ  -BP goal <130/80  -hypokalemia noted on last bmp, order repeat bmp to be done  -consider K supplement with HCTZ and furosemide  -follow    4. HLD  -follow labs annually  -LDL goal <100    Patient is to follow up with Dr. Kee in 3 months with labs and review of symptoms.

## 2023-05-30 NOTE — PATIENT INSTRUCTIONS
Follow up in 3 months with labs to check your potassium level.    Take BP reading once a day 2 hours after AM medications, I will reach out to you by my chart or phone to check in on your BP readings.    BP goal 110-130/60-90.    Referral to sleep medicine is placed if needed.

## 2023-05-31 LAB — EKG IMPRESSION: NORMAL

## 2023-05-31 PROCEDURE — 93010 ELECTROCARDIOGRAM REPORT: CPT | Performed by: INTERNAL MEDICINE

## 2023-06-05 DIAGNOSIS — E87.6 HYPOKALEMIA: ICD-10-CM

## 2023-06-06 ENCOUNTER — TELEPHONE (OUTPATIENT)
Dept: CARDIOLOGY | Facility: MEDICAL CENTER | Age: 63
End: 2023-06-06
Payer: MEDICARE

## 2023-06-06 NOTE — TELEPHONE ENCOUNTER
Phone Number Called: [phone]    Call outcome: Left detailed message for patient. Informed to call back with any additional questions.    Message: Called to inform patient of recent lab results below and to schedule a follow up appointment.       ------------------------------------------------------------------------  ----- Message from BHARTI Valdez sent at 6/6/2023 10:06 AM PDT -----  No my chart.    K and Cr look good on labs. Continue same meds for now.    No VR apt in 3 months, recommend they obtain this before schedule fills up. If goes back into afib or has symptoms of concern, have her reach out to us for for further review of meds and symptoms. [place]

## 2023-06-13 ENCOUNTER — TELEPHONE (OUTPATIENT)
Dept: CARDIOLOGY | Facility: MEDICAL CENTER | Age: 63
End: 2023-06-13
Payer: MEDICARE

## 2023-06-17 NOTE — CARE PLAN
The patient is Stable - Low risk of patient condition declining or worsening    Shift Goals  Clinical Goals: Pt will not sustain a HR above 150 this shift  Patient Goals: comfort, pain control of R foot  Family Goals: DARREN    Progress made toward(s) clinical / shift goals:  Pt has not sustained above 150 this shift, pt's foot pain has been adequately alleviated by tylenol and ice pack, pt is maintaining O2 sats above 90 on RA    Patient is not progressing towards the following goals: Pt tachs up to 180s when ambulating (unsustained)      Problem: Hemodynamics  Goal: Patient's hemodynamics, fluid balance and neurologic status will be stable or improve  Outcome: Progressing     Problem: Pain - Standard  Goal: Alleviation of pain or a reduction in pain to the patient’s comfort goal  Outcome: Progressing      0.8